# Patient Record
Sex: MALE | Race: WHITE | Employment: FULL TIME | URBAN - NONMETROPOLITAN AREA
[De-identification: names, ages, dates, MRNs, and addresses within clinical notes are randomized per-mention and may not be internally consistent; named-entity substitution may affect disease eponyms.]

---

## 2022-08-08 DIAGNOSIS — D69.6 THROMBOCYTOPENIA (HCC): Primary | ICD-10-CM

## 2022-08-10 ENCOUNTER — HOSPITAL ENCOUNTER (OUTPATIENT)
Dept: INFUSION THERAPY | Age: 52
Discharge: HOME | End: 2022-08-10
Payer: COMMERCIAL

## 2022-08-10 ENCOUNTER — OFFICE VISIT (OUTPATIENT)
Dept: HEMATOLOGY | Age: 52
End: 2022-08-10
Payer: COMMERCIAL

## 2022-08-10 VITALS
DIASTOLIC BLOOD PRESSURE: 80 MMHG | HEIGHT: 75 IN | SYSTOLIC BLOOD PRESSURE: 132 MMHG | OXYGEN SATURATION: 95 % | WEIGHT: 271.9 LBS | HEART RATE: 74 BPM | BODY MASS INDEX: 33.81 KG/M2

## 2022-08-10 DIAGNOSIS — D69.6 THROMBOCYTOPENIA (HCC): ICD-10-CM

## 2022-08-10 DIAGNOSIS — D69.6 THROMBOCYTOPENIA (HCC): Primary | ICD-10-CM

## 2022-08-10 DIAGNOSIS — R53.83 OTHER FATIGUE: ICD-10-CM

## 2022-08-10 DIAGNOSIS — R71.8 ELEVATED MCV: ICD-10-CM

## 2022-08-10 LAB
APTT: 33.3 SEC (ref 26–36.2)
BASOPHILS ABSOLUTE: 0.05 K/UL (ref 0.01–0.08)
BASOPHILS RELATIVE PERCENT: 0.9 % (ref 0.1–1.2)
C-REACTIVE PROTEIN: 1.07 MG/DL (ref 0–0.5)
D DIMER: 0.29 UG/ML FEU (ref 0–0.48)
EOSINOPHILS ABSOLUTE: 0.09 K/UL (ref 0.04–0.54)
EOSINOPHILS RELATIVE PERCENT: 1.7 % (ref 0.7–7)
FIBRINOGEN: 308 MG/DL (ref 238–505)
FOLATE: >20 NG/ML (ref 4.5–32.2)
HAV IGM SER IA-ACNC: NORMAL
HCT VFR BLD CALC: 51.6 % (ref 40.1–51)
HEMOGLOBIN: 16.7 G/DL (ref 13.7–17.5)
HEPATITIS B CORE IGM ANTIBODY: NORMAL
HEPATITIS B SURFACE ANTIGEN INTERPRETATION: NORMAL
HEPATITIS C ANTIBODY INTERPRETATION: NORMAL
HIV-1 P24 AG: NORMAL
INR BLD: 1.12 (ref 0.88–1.18)
LYMPHOCYTES ABSOLUTE: 1.48 K/UL (ref 1.18–3.74)
LYMPHOCYTES RELATIVE PERCENT: 28.1 % (ref 19.3–53.1)
MCH RBC QN AUTO: 33.6 PG (ref 25.7–32.2)
MCHC RBC AUTO-ENTMCNC: 32.4 G/DL (ref 32.3–36.5)
MCV RBC AUTO: 103.8 FL (ref 79–92.2)
MONOCYTES ABSOLUTE: 0.5 K/UL (ref 0.24–0.82)
MONOCYTES RELATIVE PERCENT: 9.5 % (ref 4.7–12.5)
NEUTROPHILS ABSOLUTE: 3.14 K/UL (ref 1.56–6.13)
NEUTROPHILS RELATIVE PERCENT: 59.6 % (ref 34–71.1)
PDW BLD-RTO: 12.5 % (ref 11.6–14.4)
PLATELET # BLD: 46 K/UL (ref 163–337)
PMV BLD AUTO: 11.9 FL (ref 7.4–10.4)
PROTHROMBIN TIME: 14.4 SEC (ref 12–14.6)
RAPID HIV 1&2: NORMAL
RBC # BLD: 4.97 M/UL (ref 4.63–6.08)
RHEUMATOID FACTOR: 12 IU/ML
SEDIMENTATION RATE, ERYTHROCYTE: 7 MM/HR (ref 0–15)
TSH SERPL DL<=0.05 MIU/L-ACNC: 1.64 UIU/ML (ref 0.27–4.2)
VITAMIN B-12: 783 PG/ML (ref 211–946)
WBC # BLD: 5.27 K/UL (ref 4.23–9.07)

## 2022-08-10 PROCEDURE — 99204 OFFICE O/P NEW MOD 45 MIN: CPT | Performed by: NURSE PRACTITIONER

## 2022-08-10 PROCEDURE — 85025 COMPLETE CBC W/AUTO DIFF WBC: CPT

## 2022-08-10 PROCEDURE — 99202 OFFICE O/P NEW SF 15 MIN: CPT

## 2022-08-10 RX ORDER — CETIRIZINE HYDROCHLORIDE 10 MG/1
10 TABLET ORAL DAILY
COMMUNITY

## 2022-08-10 RX ORDER — M-VIT,TX,IRON,MINS/CALC/FOLIC 27MG-0.4MG
1 TABLET ORAL DAILY
COMMUNITY

## 2022-08-10 ASSESSMENT — PROMIS GLOBAL HEALTH SCALE
IN GENERAL, HOW WOULD YOU RATE YOUR MENTAL HEALTH, INCLUDING YOUR MOOD AND YOUR ABILITY TO THINK [ON A SCALE OF 1 (POOR) TO 5 (EXCELLENT)]?: 3
IN THE PAST 7 DAYS, HOW OFTEN HAVE YOU BEEN BOTHERED BY EMOTIONAL PROBLEMS, SUCH AS FEELING ANXIOUS, DEPRESSED, OR IRRITABLE [ON A SCALE FROM 1 (NEVER) TO 5 (ALWAYS)]?: 2
TO WHAT EXTENT ARE YOU ABLE TO CARRY OUT YOUR EVERYDAY PHYSICAL ACTIVITIES SUCH AS WALKING, CLIMBING STAIRS, CARRYING GROCERIES, OR MOVING A CHAIR [ON A SCALE OF 1 (NOT AT ALL) TO 5 (COMPLETELY)]?: 4
IN GENERAL, WOULD YOU SAY YOUR HEALTH IS...[ON A SCALE OF 1 (POOR) TO 5 (EXCELLENT)]: 4
IN GENERAL, HOW WOULD YOU RATE YOUR SATISFACTION WITH YOUR SOCIAL ACTIVITIES AND RELATIONSHIPS [ON A SCALE OF 1 (POOR) TO 5 (EXCELLENT)]?: 3
IN THE PAST 7 DAYS, HOW WOULD YOU RATE YOUR FATIGUE ON AVERAGE [ON A SCALE FROM 1 (NONE) TO 5 (VERY SEVERE)]?: 5
SUM OF RESPONSES TO QUESTIONS 3, 6, 7, & 8: 12
IN GENERAL, WOULD YOU SAY YOUR QUALITY OF LIFE IS...[ON A SCALE OF 1 (POOR) TO 5 (EXCELLENT)]: 4
IN GENERAL, PLEASE RATE HOW WELL YOU CARRY OUT YOUR USUAL SOCIAL ACTIVITIES (INCLUDES ACTIVITIES AT HOME, AT WORK, AND IN YOUR COMMUNITY, AND RESPONSIBILITIES AS A PARENT, CHILD, SPOUSE, EMPLOYEE, FRIEND, ETC) [ON A SCALE OF 1 (POOR) TO 5 (EXCELLENT)]?: 3
IN GENERAL, HOW WOULD YOU RATE YOUR PHYSICAL HEALTH [ON A SCALE OF 1 (POOR) TO 5 (EXCELLENT)]?: 3
IN THE PAST 7 DAYS, HOW WOULD YOU RATE YOUR PAIN ON AVERAGE [ON A SCALE FROM 0 (NO PAIN) TO 10 (WORST IMAGINABLE PAIN)]?: 0
SUM OF RESPONSES TO QUESTIONS 2, 4, 5, & 10: 12

## 2022-08-10 NOTE — PROGRESS NOTES
Initial consultation note      Pt Name: Isabel Summers: 1970  MRN: [id number]    Date of evaluation: 8/10/2022  History Obtained From:  patient, electronic medical record    CHIEF COMPLAINT:    Chief Complaint   Patient presents with    New Patient     Low Platlets     HISTORY OF PRESENT ILLNESS:    Germania Presley is a 46 y.o.  male who is is here today in initial hematology consultation for thrombocytopenia, referred by Dr Kaye Centeno for for further evaluation and recommendations. Tarsha Pereira has a past medical history to include hypertension, elevated liver enzymes,  type II diabetes, tobacco use smoker 1 pack per day and routine consumption of alcohol (Drinks 6 beers every night and 18 on Saturday and  since age 10). He denied any excessive bruising or bleeding to include melena, epistaxis, hemoptysis, hematuria or hematochezia. Tarsha Pereira reported that he has been a  for the past 35 years and approximately 30 years ago he had significant exposure to true acid while working in [place]. He denied any significant other medical problems. I reviewed the available/following findings at initial consultation on 8/10/2022:    2019 Serology results  WBC 8.6, Hgb 17.3, Hct 50.6, MCV 95.3, platelets 924,594  Bili 0.8  AST 41  ALT 61  Alk Phos 116    2022 Serology results  WBC 5.5, Hgb 16.2, Hct 47.2, MCV 98, platelets 86,416  Alk Phos 187  Bili 0.9  AST 46  ALT 51    2022 Serology results  WBC 5.4, Hgb 15.9, Hct 46.3, , platelets 609,122  Alk Phos 226  Bili 0.5  AST 69  ALT 60    CBC at initial consultation on 8/10/2022: WBC 5.27, ANC 3.14, lymphocytes 28.1%, hemoglobin 16.7, hematocrit 51.6, .8 and a platelet count of 24,848      HEMATOLOGIC HISTORY:   Diagnosis:   Thrombocytopenia, currently of unknown etiology although has a significant history of alcohol use    Treatment summary:  Work-up in progress    Age-appropriate health screenin2022- PSA 0.8      Past Medical History:    Past Medical History:   Diagnosis Date    Diabetes mellitus ([address]     Elevated liver enzymes     Hypertension        Past Surgical History:    Past Surgical History:   Procedure Laterality Date    LITHOTRIPSY         Current Medications:    Current Outpatient Medications   Medication Sig Dispense Refill    cetirizine (ZYRTEC) 10 MG tablet Take 10 mg by mouth in the morning. Multiple Vitamins-Minerals (THERAPEUTIC MULTIVITAMIN-MINERALS) tablet Take 1 tablet by mouth in the morning. No current facility-administered medications for this visit. Allergies: Not on File    Social History:         Family History:   Family History   Problem Relation Age of Onset    Prostate Cancer Father        Vitals:  Vitals:    08/10/22 1021   BP: 132/80   Site: Left Upper Arm   Position: Sitting   Pulse: 74   SpO2: 95%   Weight: 271 lb 14.4 oz (123.3 kg)   Height: 6' 3\" (1.905 m)        Subjective   REVIEW OF SYSTEMS:   Review of Systems   Constitutional: Negative. Negative for chills, diaphoresis and fever. HENT: Negative. Negative for congestion, ear pain, hearing loss, nosebleeds, sore throat and tinnitus. Eyes: Negative. Negative for pain, discharge and redness. Respiratory: Negative. Negative for cough, shortness of breath and wheezing. Cardiovascular: Negative. Negative for chest pain, palpitations and leg swelling. Gastrointestinal: Negative. Negative for abdominal pain, blood in stool, constipation, diarrhea, nausea and vomiting. Endocrine: Negative for polydipsia. Genitourinary:  Negative for dysuria, flank pain, frequency, hematuria and urgency. Musculoskeletal: Negative. Negative for back pain, myalgias and neck pain. Skin: Negative. Negative for rash. Neurological: Negative. Negative for dizziness, tremors, seizures, weakness and headaches. Hematological:  Does not bruise/bleed easily. Psychiatric/Behavioral: Negative.   The patient is not nervous/anxious. Objective   PHYSICAL EXAM:  Physical Exam  Vitals reviewed. Constitutional:       General: He is not in acute distress. Appearance: He is well-developed. HENT:      Head: Normocephalic and atraumatic. Mouth/Throat:      Pharynx: Uvula midline. Tonsils: No tonsillar exudate. Eyes:      General: Lids are normal.      Conjunctiva/sclera: Conjunctivae normal.      Pupils: Pupils are equal, round, and reactive to light. Neck:      Thyroid: No thyroid mass or thyromegaly. Vascular: No JVD. Trachea: Trachea normal. No tracheal deviation. Cardiovascular:      Rate and Rhythm: Normal rate and regular rhythm. Pulses: Normal pulses. Heart sounds: Normal heart sounds. Pulmonary:      Effort: Pulmonary effort is normal. No respiratory distress. Breath sounds: Normal breath sounds. No wheezing or rales. Chest:      Chest wall: No tenderness. Abdominal:      General: Bowel sounds are normal. There is no distension. Palpations: Abdomen is soft. There is no mass. Tenderness: There is no abdominal tenderness. There is no guarding. Comments: Unable to palpate for organomegaly due to body habitus   Musculoskeletal:         General: No tenderness or deformity. Cervical back: Normal range of motion and neck supple. Comments: Range of motion within normal limits x4 extremities   Skin:     General: Skin is warm. Findings: No bruising, erythema or rash. Neurological:      Mental Status: He is alert and oriented to person, place, and time. Cranial Nerves: No cranial nerve deficit. Coordination: Coordination normal.   Psychiatric:         Behavior: Behavior normal.         Thought Content:  Thought content normal.       Labs reviewed today:  Lab Results   Component Value Date    WBC 5.27 08/10/2022    HGB 16.7 08/10/2022    HCT 51.6 (HH) 08/10/2022    .8 (H) 08/10/2022    PLT 46 (LL) 08/10/2022     Lab Results Component Value Date    NEUTROABS 3.14 08/10/2022         ASSESSMENT/PLAN:      1. Thrombocytopenia ([address] platelet count of 92,027 today, platelet count was noted to be slightly declined in November 2019 at 143,000 and appears to continue to decline, minimal lab results available for review. Denies any excessive bruising or bleeding to include melena, epistaxis, hemoptysis, hematuria or hematochezia. Routine consumption of alcohol (Drinks 6 beers every night and 18 on Saturday and Sunday since age 10). No known history of hypothyroidism, will evaluate for B12 folate deficiency, not related to medications only takes an a multivitamin and Zyrtec. Mild elevation alkaline phosphatase has been noted    - US ABDOMEN COMPLETE; to evaluate liver and spleen size  -CBC every 2 weeks    Obtain the following labs for further evaluation  - Path Review, Smear; Future  - Protime-INR; Future  - APTT; Future  - GERALDINE Screen with Reflex; Future  -Antiphospholipid syndrome  - C-Reactive Protein; Future  - Fibrinogen; Future  - D-Dimer, Quantitative; Future  -Platelet function assay  - Hepatitis Panel, Acute; Future  - HIV Rapid 1&2; Future  - Rheumatoid Factor; Future  - Sedimentation Rate; Future    Discussed potential need for bone marrow aspiration and biopsy in the future if platelet count does not recover for further evaluation. Lisbeth Brown would like to have this completed in [address] [place] [address] under sedation    2. Elevated MCV with a normal hemoglobin: .8, hemoglobin 16.7 and hematocrit 51.6    - Vitamin B12; Future  - Folate; Future    3. Other fatigue    - TSH; Future     I discussed all of the above findings included in the assessment and plan with the patient and the patient is in agreement to move forward with current recommendations/treatment. I have addressed all of their questions and concerns that were verbalized.     FOLLOW UP:  Follow-up appointment made for 6 weeks, or sooner, if needed  Continue to follow with other medical providers as recommended  Labs at next visit: CBC and CMP      EMR Dragon/Transcription disclaimer:   Much of this encounter note is an electronic transcription/translation of spoken language to printed text. The electronic translation of spoken language may permit erroneous, or at times, nonsensical words or phrases to be inadvertently transcribed; although attempts have made to review the note for such errors, some may still exist.  Please excuse any unrecognized transcription errors and contact us if the air is unintelligible or needs documented correction. Also, portions of this note have been copied forward, however, changed to reflect the most current clinical status of this patient. Electronically signed by Iver Lombard, APRN on 8/10/2022 at 11:01 AM   Alex DELONG am pre-charting as a registered nurse for [facility] Sloan.

## 2022-08-13 LAB
ANA IGG, ELISA: NORMAL
BLOOD SMEAR REVIEW: NORMAL

## 2022-08-14 ASSESSMENT — ENCOUNTER SYMPTOMS
EYE DISCHARGE: 0
ABDOMINAL PAIN: 0
SHORTNESS OF BREATH: 0
VOMITING: 0
EYE PAIN: 0
GASTROINTESTINAL NEGATIVE: 1
CONSTIPATION: 0
NAUSEA: 0
COUGH: 0
WHEEZING: 0
SORE THROAT: 0
BACK PAIN: 0
EYES NEGATIVE: 1
DIARRHEA: 0
RESPIRATORY NEGATIVE: 1
BLOOD IN STOOL: 0
EYE REDNESS: 0

## 2022-08-19 ENCOUNTER — HOSPITAL ENCOUNTER (OUTPATIENT)
Dept: ULTRASOUND IMAGING | Age: 52
Discharge: HOME | End: 2022-08-19
Payer: COMMERCIAL

## 2022-08-19 DIAGNOSIS — D69.6 THROMBOCYTOPENIA (HCC): ICD-10-CM

## 2022-08-19 LAB
ANTICARDIOLIPIN IGG ANTIBODY: <10 GPL
BETA-2 GLYCOPROTEIN 1 IGG ANTIBODY: <10 SGU
BETA-2 GLYCOPROTEIN 1 IGM ANTIBODY: <10 SMU
CARDIOLIPIN AB IGM: <10 MPL
DRVVT CONFIRMATION TEST: ABNORMAL RATIO
DRVVT SCREEN: 32 SEC (ref 33–44)
DRVVT,DIL: ABNORMAL SEC (ref 33–44)
HEXAGONAL PHOSPHOLIPID NEUTRALIZAT TEST: ABNORMAL
LUPUS ANTICOAG INTERP: ABNORMAL
PLT NEUTA: ABNORMAL
PT D: 14.3 SEC (ref 12–15.5)
PTT D: 46 SEC (ref 32–48)
PTT-D CORR REFLEX: ABNORMAL SEC (ref 32–48)
PTT-HEPARIN NEUTRALIZED: ABNORMAL SEC (ref 32–48)
REPTILASE TIME: ABNORMAL SEC
THROMBIN TIME: ABNORMAL SEC (ref 14.7–19.5)

## 2022-08-19 PROCEDURE — 76700 US EXAM ABDOM COMPLETE: CPT

## 2022-08-27 LAB
BASOPHILS ABSOLUTE: 0.1 X10E3/UL (ref 0–0.2)
BASOPHILS RELATIVE PERCENT: 1 %
EOSINOPHILS ABSOLUTE: 0.1 X10E3/UL (ref 0–0.4)
EOSINOPHILS RELATIVE PERCENT: 3 %
ERYTHROCYTES, NUCLEATED/100 LEU: ABNORMAL
HCT VFR BLD CALC: 48.7 % (ref 37.5–51)
HEMOGLOBIN: 16.7 G/DL (ref 13–17.7)
IMMATURE CELLS ABSOLUTE COUNT: ABNORMAL
IMMATURE GRANS (ABS): 0 X10E3/UL (ref 0–0.1)
IMMATURE GRANULOCYTES: 0 %
LYMPHOCYTES ABSOLUTE: 1.5 X10E3/UL (ref 0.7–3.1)
LYMPHOCYTES RELATIVE PERCENT: 28 %
MCH RBC QN AUTO: 33.5 PG (ref 26.6–33)
MCHC RBC AUTO-ENTMCNC: 34.3 G/DL (ref 31.5–35.7)
MCV RBC AUTO: 98 FL (ref 79–97)
MONOCYTES ABSOLUTE: 0.6 X10E3/UL (ref 0.1–0.9)
MONOCYTES RELATIVE PERCENT: 11 %
MORPHOLOGY: ABNORMAL
NEUTROPHILS ABSOLUTE: 3.1 X10E3/UL (ref 1.4–7)
PDW BLD-RTO: 12.8 % (ref 11.6–15.4)
PLATELET # BLD: 76 X10E3/UL (ref 150–450)
RBC # BLD: 4.98 X10E6/UL (ref 4.14–5.8)
SEGMENTED NEUTROPHILS RELATIVE PERCENT: 57 %
WBC # BLD: 5.4 X10E3/UL (ref 3.4–10.8)

## 2022-09-22 NOTE — PROGRESS NOTES
Initial consultation note      Pt Name: Roxana Harmon: 1970  MRN: [id number]    Date of evaluation: 8/10/2022  History Obtained From:  patient, electronic medical record    CHIEF COMPLAINT:    Chief Complaint   Patient presents with    Follow-up     Thrombocytopenia ([address]     HISTORY OF PRESENT ILLNESS:    Suzanne Tracey is a 45 y.o.  male who is returns today after being seen in initial consultation on 8/10/2022 for thrombocytopenia to review serology studies and for further recommendations. .  He denies any excessive bruising or bleeding to include melena, epistaxis, hemoptysis, hematuria or hematochezia. [facility] confirms he continues to consume alcohol daily, 6 beers every night and approximately 18 on the weekends. I reviewed the following results with Chuywoodrow Postal and his spouse, no findings to support thrombocytopenia. Labs 8/10/2022:   Peripheral blood smear:  MACROCYTIC NORMOCHROMIC ERYTHROCYTES   MODERATE THROMBOCYTOPENIA    ERYTHROCYTES: normal count, macrocytic normochromic, minimal anisopoikilocytosis, no significant polychromasia   WHITE BLOOD CELLS: normal count, mature forms with normal segmentation and granulation, no increase in blasts   PLATELETS: decreased, normal size and granularity      - PT: 14.4, INR: 1.12, APTT: 33.3  - GERALDINE Screen with Reflex: not detected  -Antiphospholipid syndrome: Lupus anticoagulant: not detected, Cardiolipin antibodies IgM: <10 , IgG: <10,  Beta-2 glycoprotein antibodies IgM: <10 , IgG: <10  - C-Reactive Protein: 1.07  - Fibrinogen: 308  - D-Dimer, Quantitative:0.29  - Hepatitis Panel, Acute: non-reactive  - HIV Rapid 1&2: non-reactive  - Rheumatoid Factor: 12.0  - Sedimentation Rate: 7  -TSH: 1.640  -Folate:>20.0  -Vitamin B12 783    8/19/2022: Ultrasound of the abdomen  Hepatomegaly with liver measuring 23.7 cm and increase echogenicity.   Splenomegaly with the spleen measuring 19.6 x 6.2 x 20 cm    Recommendation is to move forward with a bone marrow aspiration and biopsy, Dietrich Gottron is in agreement although he will have to delay for a few weeks due to his work schedule, biopsy needs to be scheduled on a Friday. Today's clinic visit to include physical assessment, review of systems, any lab or radiographic findings that were available and plan of care are documented below. HEMATOLOGIC HISTORY:   Diagnosis: Thrombocytopenia with hepatosplenomegaly and long-term alcohol use with differential of bone marrow dyscrasia     Treatment summary:  Work-up in progress    Dietrich Gottron was seen in initial hematology consultation on 8/10/2022 for thrombocytopenia, referred by Dr Jerilyn Vitale for  further evaluation and recommendations. Dietrich Gottron has a past medical history to include hypertension, elevated liver enzymes,  type II diabetes, tobacco use smoker 1 pack per day and routine consumption of alcohol (Drinks 6 beers every night and 18 on Saturday and Sunday since age 10). He denied any excessive bruising or bleeding to include melena, epistaxis, hemoptysis, hematuria or hematochezia. Dietrich Gottron reported that he has been a  for the past 35 years and approximately 30 years ago he had significant exposure to true acid while working in [facility]. He denied any significant other medical problems.       I reviewed the available/following findings at initial consultation on 8/10/2022:    11/04/2019 Serology results  WBC 8.6, Hgb 17.3, Hct 50.6, MCV 95.3, platelets 081,501  Bili 0.8  AST 41  ALT 61  Alk Phos 116    07/01/2022 Serology results  WBC 5.5, Hgb 16.2, Hct 47.2, MCV 98, platelets 29,268  Alk Phos 187  Bili 0.9  AST 46  ALT 51    07/08/2022 Serology results  WBC 5.4, Hgb 15.9, Hct 46.3, , platelets 960,899  Alk Phos 226  Bili 0.5  AST 69  ALT 60    CBC at initial consultation on 8/10/2022: WBC 5.27, ANC 3.14, lymphocytes 28.1%, hemoglobin 16.7, hematocrit 51.6, .8 and a platelet count of 66,475      Labs 8/10/2022:   Peripheral blood smear:  MACROCYTIC NORMOCHROMIC ERYTHROCYTES   MODERATE THROMBOCYTOPENIA    ERYTHROCYTES: normal count, macrocytic normochromic, minimal anisopoikilocytosis, no significant polychromasia   WHITE BLOOD CELLS: normal count, mature forms with normal segmentation and granulation, no increase in blasts   PLATELETS: decreased, normal size and granularity      - PT: 14.4, INR: 1.12, APTT: 33.3  - GERALDINE Screen with Reflex: not detected  -Antiphospholipid syndrome: Lupus anticoagulant: not detected, Cardiolipin antibodies IgM: <10 , IgG: <10,  Beta-2 glycoprotein antibodies IgM: <10 , IgG: <10  - C-Reactive Protein: 1.07  - Fibrinogen: 308  - D-Dimer, Quantitative:0.29  - Hepatitis Panel, Acute: non-reactive  - HIV Rapid 1&2: non-reactive  - Rheumatoid Factor: 12.0  - Sedimentation Rate: 7  -TSH: 1.640  -Folate:>20.0      2022: Ultrasound of the abdomen  Hepatomegaly with liver measuring 23.7 cm and increase echogenicity. Splenomegaly with the spleen measuring 19.6 x 6.2 x 20 cm    Age-appropriate health screenin2022- PSA 0.8      Past Medical History:    Past Medical History:   Diagnosis Date    Diabetes mellitus ([address]     Elevated liver enzymes     Hypertension        Past Surgical History:    Past Surgical History:   Procedure Laterality Date    LITHOTRIPSY         Current Medications:    Current Outpatient Medications   Medication Sig Dispense Refill    metFORMIN (GLUCOPHAGE) 500 MG tablet       cetirizine (ZYRTEC) 10 MG tablet Take 10 mg by mouth in the morning. Multiple Vitamins-Minerals (THERAPEUTIC MULTIVITAMIN-MINERALS) tablet Take 1 tablet by mouth in the morning. No current facility-administered medications for this visit.         Allergies: No Known Allergies    Social History:         Family History:   Family History   Problem Relation Age of Onset    Prostate Cancer Father        Vitals:  Vitals:    22 1031   BP: (!) 152/86   Pulse: 84   SpO2: 96%   Weight: 271 lb 11.2 oz (123.2 kg)        Subjective REVIEW OF SYSTEMS:   Review of Systems   Constitutional: Negative. Negative for chills, diaphoresis and fever. HENT: Negative. Negative for congestion, ear pain, hearing loss, nosebleeds, sore throat and tinnitus. Eyes: Negative. Negative for pain, discharge and redness. Respiratory: Negative. Negative for cough, shortness of breath and wheezing. Cardiovascular: Negative. Negative for chest pain, palpitations and leg swelling. Gastrointestinal: Negative. Negative for abdominal pain, blood in stool, constipation, diarrhea, nausea and vomiting. Endocrine: Negative for polydipsia. Genitourinary:  Negative for dysuria, flank pain, frequency, hematuria and urgency. Musculoskeletal: Negative. Negative for back pain, myalgias and neck pain. Skin: Negative. Negative for rash. Neurological: Negative. Negative for dizziness, tremors, seizures, weakness and headaches. Hematological:  Does not bruise/bleed easily. Psychiatric/Behavioral: Negative. The patient is not nervous/anxious. Objective   PHYSICAL EXAM:  Physical Exam  Vitals reviewed. Constitutional:       General: He is not in acute distress. Appearance: He is well-developed. HENT:      Head: Normocephalic and atraumatic. Mouth/Throat:      Pharynx: Uvula midline. Tonsils: No tonsillar exudate. Eyes:      General: Lids are normal.      Conjunctiva/sclera: Conjunctivae normal.      Pupils: Pupils are equal, round, and reactive to light. Neck:      Thyroid: No thyroid mass or thyromegaly. Vascular: No JVD. Trachea: Trachea normal. No tracheal deviation. Cardiovascular:      Rate and Rhythm: Normal rate and regular rhythm. Pulses: Normal pulses. Heart sounds: Normal heart sounds. Pulmonary:      Effort: Pulmonary effort is normal. No respiratory distress. Breath sounds: Normal breath sounds. No wheezing or rales. Chest:      Chest wall: No tenderness.    Abdominal:      General: Bowel sounds are normal. There is no distension. Palpations: Abdomen is soft. There is no mass. Tenderness: There is no abdominal tenderness. There is no guarding. Musculoskeletal:         General: No tenderness or deformity. Cervical back: Normal range of motion and neck supple. Comments: Range of motion within normal limits x4 extremities   Skin:     General: Skin is warm. Findings: No bruising, erythema or rash. Neurological:      Mental Status: He is alert and oriented to person, place, and time. Cranial Nerves: No cranial nerve deficit. Coordination: Coordination normal.   Psychiatric:         Behavior: Behavior normal.         Thought Content: Thought content normal.       Labs reviewed today:  Lab Results   Component Value Date    WBC 4.96 09/23/2022    HGB 16.3 09/23/2022    HCT 49.3 09/23/2022    .0 (H) 09/23/2022    PLT 66 (L) 09/23/2022     Lab Results   Component Value Date    NEUTROABS 3.04 09/23/2022         ASSESSMENT/PLAN:      1. Thrombocytopenia ([address] platelet count of 59,620 today. Denies any excessive bruising or bleeding to include melena, epistaxis, hemoptysis, hematuria or hematochezia. Confirms continues to consume alcohol (Drinks 6 beers every night and 18 on Saturday and Sunday since age 10).     Labs 8/10/2022:   Peripheral blood smear:  MACROCYTIC NORMOCHROMIC ERYTHROCYTES   MODERATE THROMBOCYTOPENIA    ERYTHROCYTES: normal count, macrocytic normochromic, minimal anisopoikilocytosis, no significant polychromasia   WHITE BLOOD CELLS: normal count, mature forms with normal segmentation and granulation, no increase in blasts   PLATELETS: decreased, normal size and granularity      - PT: 14.4, INR: 1.12, APTT: 33.3  - GERALDINE Screen with Reflex: not detected  -Antiphospholipid syndrome: Lupus anticoagulant: not detected, Cardiolipin antibodies IgM: <10 , IgG: <10,  Beta-2 glycoprotein antibodies IgM: <10 , IgG: <10  - C-Reactive Protein: 1.07  - Fibrinogen: 308  - D-Dimer, Quantitative:0.29  - Hepatitis Panel, Acute: non-reactive  - HIV Rapid 1&2: non-reactive  - Rheumatoid Factor: 12.0  - Sedimentation Rate: 7      8/19/2022: Ultrasound of the abdomen  Hepatomegaly with liver measuring 23.7 cm and increase echogenicity. Splenomegaly with the spleen measuring 19.6 x 6.2 x 20 cm      Thrombocytopenia is suspected to be secondary to splenomegaly, alcohol use and with a differential of a bone marrow dyscrasia. Recommendation is to move forward with a bone marrow aspiration and biopsy. , Janette Fontanez is in agreement although he will have to delay for a few weeks due to his work schedule, biopsy needs to be scheduled on a Friday. Janette Fontanez would like to have this completed in [address] [place] [address] under sedation.    -Schedule bone marrow aspiration and biopsy in [facility] [place]    2. Elevated MCV with a normal hemoglobin: .0, hemoglobin 16.3 and hematocrit 49.3  Vitamin B12 783 and folate >20 on 8/10/2022, both are within normal limits      3. Other fatigue, grade I and stable. TSH 1.640 on 8/10/2022, within normal limits      I discussed all of the above findings included in the assessment and plan with the patient and the patient is in agreement to move forward with current recommendations/treatment. I have addressed all of their questions and concerns that were verbalized. FOLLOW UP:  Follow-up appointment made for 2 weeks after scheduled bone marrow biopsy in [facility] [place], or sooner, if needed  Schedule bone marrow aspiration biopsy in [facility] [place] on a Friday  Continue to follow with other medical providers as recommended  Labs at next visit: CBC and CMP      EMR Dragon/Transcription disclaimer:   Much of this encounter note is an electronic transcription/translation of spoken language to printed text.  The electronic translation of spoken language may permit erroneous, or at times, nonsensical words or phrases to be inadvertently transcribed; although attempts have made to review the note for such errors, some may still exist.  Please excuse any unrecognized transcription errors and contact us if the error is unintelligible or needs documented correction. Also, portions of this note have been copied forward, however, changed to reflect the most current clinical status of this patient. Electronically signed by Laurel Paget, APRN on 9/29/2022 at 4:55 PM   Marjan DELONG am pre-charting as a registered nurse for [facility]IMBER[facility] Haddad.

## 2022-09-23 ENCOUNTER — HOSPITAL ENCOUNTER (OUTPATIENT)
Dept: INFUSION THERAPY | Age: 52
Discharge: HOME | End: 2022-09-23
Payer: COMMERCIAL

## 2022-09-23 ENCOUNTER — OFFICE VISIT (OUTPATIENT)
Dept: HEMATOLOGY | Age: 52
End: 2022-09-23
Payer: COMMERCIAL

## 2022-09-23 VITALS
SYSTOLIC BLOOD PRESSURE: 152 MMHG | HEART RATE: 84 BPM | DIASTOLIC BLOOD PRESSURE: 86 MMHG | WEIGHT: 271.7 LBS | OXYGEN SATURATION: 96 % | BODY MASS INDEX: 33.96 KG/M2

## 2022-09-23 DIAGNOSIS — D69.6 THROMBOCYTOPENIA (HCC): ICD-10-CM

## 2022-09-23 DIAGNOSIS — D69.6 THROMBOCYTOPENIA (HCC): Primary | ICD-10-CM

## 2022-09-23 DIAGNOSIS — R71.8 ELEVATED MCV: ICD-10-CM

## 2022-09-23 LAB
BASOPHILS ABSOLUTE: 0.05 K/UL (ref 0.01–0.08)
BASOPHILS RELATIVE PERCENT: 1 % (ref 0.1–1.2)
EOSINOPHILS ABSOLUTE: 0.06 K/UL (ref 0.04–0.54)
EOSINOPHILS RELATIVE PERCENT: 1.2 % (ref 0.7–7)
HCT VFR BLD CALC: 49.3 % (ref 40.1–51)
HEMOGLOBIN: 16.3 G/DL (ref 13.7–17.5)
LYMPHOCYTES ABSOLUTE: 1.33 K/UL (ref 1.18–3.74)
LYMPHOCYTES RELATIVE PERCENT: 26.8 % (ref 19.3–53.1)
MCH RBC QN AUTO: 33.4 PG (ref 25.7–32.2)
MCHC RBC AUTO-ENTMCNC: 33.1 G/DL (ref 32.3–36.5)
MCV RBC AUTO: 101 FL (ref 79–92.2)
MONOCYTES ABSOLUTE: 0.47 K/UL (ref 0.24–0.82)
MONOCYTES RELATIVE PERCENT: 9.5 % (ref 4.7–12.5)
NEUTROPHILS ABSOLUTE: 3.04 K/UL (ref 1.56–6.13)
NEUTROPHILS RELATIVE PERCENT: 61.3 % (ref 34–71.1)
PDW BLD-RTO: 13 % (ref 11.6–14.4)
PLATELET # BLD: 66 K/UL (ref 163–337)
PMV BLD AUTO: 11.9 FL (ref 7.4–10.4)
RBC # BLD: 4.88 M/UL (ref 4.63–6.08)
WBC # BLD: 4.96 K/UL (ref 4.23–9.07)

## 2022-09-23 PROCEDURE — 99213 OFFICE O/P EST LOW 20 MIN: CPT | Performed by: NURSE PRACTITIONER

## 2022-09-23 PROCEDURE — 99212 OFFICE O/P EST SF 10 MIN: CPT

## 2022-09-23 PROCEDURE — 85025 COMPLETE CBC W/AUTO DIFF WBC: CPT

## 2022-09-29 ASSESSMENT — ENCOUNTER SYMPTOMS
SHORTNESS OF BREATH: 0
EYE DISCHARGE: 0
BLOOD IN STOOL: 0
GASTROINTESTINAL NEGATIVE: 1
EYE PAIN: 0
NAUSEA: 0
EYES NEGATIVE: 1
SORE THROAT: 0
EYE REDNESS: 0
COUGH: 0
RESPIRATORY NEGATIVE: 1
ABDOMINAL PAIN: 0
WHEEZING: 0
VOMITING: 0
BACK PAIN: 0
DIARRHEA: 0
CONSTIPATION: 0

## 2022-10-01 LAB
BASOPHILS ABSOLUTE: 0 X10E3/UL (ref 0–0.2)
BASOPHILS RELATIVE PERCENT: 1 %
EOSINOPHILS ABSOLUTE: 0.1 X10E3/UL (ref 0–0.4)
EOSINOPHILS RELATIVE PERCENT: 2 %
ERYTHROCYTES, NUCLEATED/100 LEU: ABNORMAL
HCT VFR BLD CALC: 47.7 % (ref 37.5–51)
HEMOGLOBIN: 16.4 G/DL (ref 13–17.7)
IMMATURE CELLS ABSOLUTE COUNT: ABNORMAL
IMMATURE GRANS (ABS): 0 X10E3/UL (ref 0–0.1)
IMMATURE GRANULOCYTES: 1 %
LYMPHOCYTES ABSOLUTE: 1.1 X10E3/UL (ref 0.7–3.1)
LYMPHOCYTES RELATIVE PERCENT: 23 %
MCH RBC QN AUTO: 33.5 PG (ref 26.6–33)
MCHC RBC AUTO-ENTMCNC: 34.4 G/DL (ref 31.5–35.7)
MCV RBC AUTO: 97 FL (ref 79–97)
MONOCYTES ABSOLUTE: 0.6 X10E3/UL (ref 0.1–0.9)
MONOCYTES RELATIVE PERCENT: 12 %
MORPHOLOGY: ABNORMAL
NEUTROPHILS ABSOLUTE: 3 X10E3/UL (ref 1.4–7)
PDW BLD-RTO: 12.7 % (ref 11.6–15.4)
PLATELET # BLD: 69 X10E3/UL (ref 150–450)
RBC # BLD: 4.9 X10E6/UL (ref 4.14–5.8)
SEGMENTED NEUTROPHILS RELATIVE PERCENT: 61 %
WBC # BLD: 4.8 X10E3/UL (ref 3.4–10.8)

## 2022-10-11 ENCOUNTER — ANESTHESIA EVENT (OUTPATIENT)
Dept: OPERATING ROOM | Age: 52
End: 2022-10-11

## 2022-10-13 NOTE — H&P
Patient Name: Jono Becerra  : 1970  MRN: [id number]  DATE: 10/14/22    Allergies: No Known Allergies       History and Physical    Procedure:    [x] Bone Marrow Aspiration and Biopsy    [x] Previous office notes/History and Physical reviewed from the patients chart. Please see EMR for further details of HPI. I have examined the patient's status immediately prior to the procedure and:      Indications/HPI:    Jono Becerra is a 45 y.o.  male who is being seen for thrombocytopenia suspected to be secondary to splenomegaly, alcohol use and with a differential of a bone marrow dyscrasia. Bone marrow aspiration and biopsy is warranted for further evaluation. HEMATOLOGIC HISTORY:   Diagnosis: Thrombocytopenia with hepatosplenomegaly and long-term alcohol use with differential of bone marrow dyscrasia      Treatment summary:  Work-up in progress     Krystle Valencia was seen in initial hematology consultation on 8/10/2022 for thrombocytopenia, referred by Dr Layne Solomon for  further evaluation and recommendations. Krystle Valencia has a past medical history to include hypertension, elevated liver enzymes,  type II diabetes, tobacco use smoker 1 pack per day and routine consumption of alcohol (Drinks 6 beers every night and 18 on Saturday and  since age 10). He denied any excessive bruising or bleeding to include melena, epistaxis, hemoptysis, hematuria or hematochezia. Krystle Valencia reported that he has been a  for the past 35 years and approximately 30 years ago he had significant exposure to true acid while working in [facility]. He denied any significant other medical problems.        I reviewed the available/following findings at initial consultation on 8/10/2022:     2019 Serology results  WBC 8.6, Hgb 17.3, Hct 50.6, MCV 95.3, platelets 255,738  Bili 0.8  AST 41  ALT 61  Alk Phos 116     2022 Serology results  WBC 5.5, Hgb 16.2, Hct 47.2, MCV 98, platelets 35,865  Alk Phos 187  Bili 0.9  AST 46  ALT 51     07/08/2022 Serology results  WBC 5.4, Hgb 15.9, Hct 46.3, , platelets 922,534  Alk Phos 226  Bili 0.5  AST 69  ALT 60     CBC at initial consultation on 8/10/2022: WBC 5.27, ANC 3.14, lymphocytes 28.1%, hemoglobin 16.7, hematocrit 51.6, .8 and a platelet count of 36,569        Labs 8/10/2022:   Peripheral blood smear:  MACROCYTIC NORMOCHROMIC ERYTHROCYTES   MODERATE THROMBOCYTOPENIA    ERYTHROCYTES: normal count, macrocytic normochromic, minimal anisopoikilocytosis, no significant polychromasia   WHITE BLOOD CELLS: normal count, mature forms with normal segmentation and granulation, no increase in blasts   PLATELETS: decreased, normal size and granularity       - PT: 14.4, INR: 1.12, APTT: 33.3  - GERALDINE Screen with Reflex: not detected  -Antiphospholipid syndrome: Lupus anticoagulant: not detected, Cardiolipin antibodies IgM: <10 , IgG: <10,  Beta-2 glycoprotein antibodies IgM: <10 , IgG: <10  - C-Reactive Protein: 1.07  - Fibrinogen: 308  - D-Dimer, Quantitative:0.29  - Hepatitis Panel, Acute: non-reactive  - HIV Rapid 1&2: non-reactive  - Rheumatoid Factor: 12.0  - Sedimentation Rate: 7  -TSH: 1.640  -Folate:>20.0        8/19/2022: Ultrasound of the abdomen  Hepatomegaly with liver measuring 23.7 cm and increase echogenicity. Splenomegaly with the spleen measuring 19.6 x 6.2 x 20 cm       Anesthesia:   [x] MAC [] Moderate Sedation   [] General   [] None     ROS: 12 pt Review of Symptoms was negative unless mentioned above    Medications:   Prior to Admission medications    Medication Sig Start Date End Date Taking? Authorizing Provider   metFORMIN (GLUCOPHAGE) 500 MG tablet  8/26/22   Historical Provider, MD   cetirizine (ZYRTEC) 10 MG tablet Take 10 mg by mouth in the morning. Historical Provider, MD   Multiple Vitamins-Minerals (THERAPEUTIC MULTIVITAMIN-MINERALS) tablet Take 1 tablet by mouth in the morning.     Historical Provider, MD       Past Medical History:  Past Medical

## 2022-10-14 ENCOUNTER — HOSPITAL ENCOUNTER (OUTPATIENT)
Age: 52
Setting detail: OUTPATIENT SURGERY
Discharge: HOME | End: 2022-10-14
Attending: INTERNAL MEDICINE | Admitting: INTERNAL MEDICINE

## 2022-10-14 ENCOUNTER — ANESTHESIA (OUTPATIENT)
Dept: OPERATING ROOM | Age: 52
End: 2022-10-14

## 2022-10-14 VITALS
SYSTOLIC BLOOD PRESSURE: 117 MMHG | HEIGHT: 75 IN | WEIGHT: 271 LBS | TEMPERATURE: 97.7 F | RESPIRATION RATE: 14 BRPM | OXYGEN SATURATION: 95 % | HEART RATE: 73 BPM | BODY MASS INDEX: 33.69 KG/M2 | DIASTOLIC BLOOD PRESSURE: 83 MMHG

## 2022-10-14 PROCEDURE — 38222 DX BONE MARROW BX & ASPIR: CPT

## 2022-10-14 RX ORDER — LIDOCAINE HYDROCHLORIDE 10 MG/ML
INJECTION, SOLUTION EPIDURAL; INFILTRATION; INTRACAUDAL; PERINEURAL PRN
Status: DISCONTINUED | OUTPATIENT
Start: 2022-10-14 | End: 2022-10-14 | Stop reason: SDUPTHER

## 2022-10-14 RX ORDER — LIDOCAINE HYDROCHLORIDE 10 MG/ML
INJECTION, SOLUTION EPIDURAL; INFILTRATION; INTRACAUDAL; PERINEURAL PRN
Status: DISCONTINUED | OUTPATIENT
Start: 2022-10-14 | End: 2022-10-14 | Stop reason: ALTCHOICE

## 2022-10-14 RX ORDER — SODIUM CHLORIDE, SODIUM LACTATE, POTASSIUM CHLORIDE, CALCIUM CHLORIDE 600; 310; 30; 20 MG/100ML; MG/100ML; MG/100ML; MG/100ML
INJECTION, SOLUTION INTRAVENOUS CONTINUOUS
Status: DISCONTINUED | OUTPATIENT
Start: 2022-10-14 | End: 2022-10-14 | Stop reason: HOSPADM

## 2022-10-14 RX ORDER — PROPOFOL 10 MG/ML
INJECTION, EMULSION INTRAVENOUS PRN
Status: DISCONTINUED | OUTPATIENT
Start: 2022-10-14 | End: 2022-10-14 | Stop reason: SDUPTHER

## 2022-10-14 RX ORDER — LIDOCAINE HYDROCHLORIDE 10 MG/ML
1 INJECTION, SOLUTION EPIDURAL; INFILTRATION; INTRACAUDAL; PERINEURAL
Status: DISCONTINUED | OUTPATIENT
Start: 2022-10-14 | End: 2022-10-14 | Stop reason: HOSPADM

## 2022-10-14 RX ADMIN — SODIUM CHLORIDE, SODIUM LACTATE, POTASSIUM CHLORIDE, CALCIUM CHLORIDE: 600; 310; 30; 20 INJECTION, SOLUTION INTRAVENOUS at 07:25

## 2022-10-14 RX ADMIN — PROPOFOL 450 MG: 10 INJECTION, EMULSION INTRAVENOUS at 08:31

## 2022-10-14 RX ADMIN — LIDOCAINE HYDROCHLORIDE 30 MG: 10 INJECTION, SOLUTION EPIDURAL; INFILTRATION; INTRACAUDAL; PERINEURAL at 08:31

## 2022-10-14 NOTE — DISCHARGE INSTRUCTIONS
Call  [facility] office with any questions at [phone]. Keep area dry until tomorrow.

## 2022-10-14 NOTE — ANESTHESIA PRE PROCEDURE
Department of Anesthesiology  Preprocedure Note       Name:  Anastasiya Norman   Age:  45 y.o.  :  1970                                          MRN:  [id number]         Date:  10/14/2022      Surgeon: Nelida Wynne):  KIMBERLY Ham    Procedure: Procedure(s):  BONE MARROW ASPIRATION BIOPSY    Medications prior to admission:   Prior to Admission medications    Medication Sig Start Date End Date Taking? Authorizing Provider   metFORMIN (GLUCOPHAGE) 500 MG tablet  22   Historical Provider, MD   cetirizine (ZYRTEC) 10 MG tablet Take 10 mg by mouth in the morning. Historical Provider, MD   Multiple Vitamins-Minerals (THERAPEUTIC MULTIVITAMIN-MINERALS) tablet Take 1 tablet by mouth in the morning. Historical Provider, MD       Current medications:    Current Facility-Administered Medications   Medication Dose Route Frequency Provider Last Rate Last Admin    lidocaine PF 1 % injection 1 mL  1 mL IntraDERmal Once PRN KIMBERYL Garcia CRNA        lactated ringers infusion   IntraVENous Continuous KIMBERLY Garcia CRNA           Allergies:  No Known Allergies    Problem List:  There is no problem list on file for this patient. Past Medical History:        Diagnosis Date    Diabetes mellitus ([address]     Elevated liver enzymes     Hypertension        Past Surgical History:        Procedure Laterality Date    LITHOTRIPSY         Social History:    Social History     Tobacco Use    Smoking status: Every Day     Packs/day: 1.00     Years: 30.00     Pack years: 30.00     Types: Cigarettes    Smokeless tobacco: Never   Substance Use Topics    Alcohol use:  Yes     Alcohol/week: 21.0 standard drinks     Types: 21 Cans of beer per week                                Ready to quit: Not Answered  Counseling given: Not Answered      Vital Signs (Current):   Vitals:    10/14/22 0658   BP: (!) 152/91   Pulse: 75   Resp: 16   Temp: 98.2 °F (36.8 °C)   TempSrc: Temporal   SpO2: 94%   Weight: 271 lb (122.9 kg)   Height: 6' 3\" (1.905 m)                                              BP Readings from Last 3 Encounters:   10/14/22 (!) 152/91   09/23/22 (!) 152/86   08/10/22 132/80       NPO Status: Time of last liquid consumption: 0530                        Time of last solid consumption: 2000                        Date of last liquid consumption: 10/14/22                        Date of last solid food consumption: 10/13/22    BMI:   Wt Readings from Last 3 Encounters:   10/14/22 271 lb (122.9 kg)   09/23/22 271 lb 11.2 oz (123.2 kg)   08/10/22 271 lb 14.4 oz (123.3 kg)     Body mass index is 33.87 kg/m². CBC:   Lab Results   Component Value Date/Time    WBC 4.8 09/30/2022 07:35 AM    RBC 4.90 09/30/2022 07:35 AM    HGB 16.4 09/30/2022 07:35 AM    HCT 47.7 09/30/2022 07:35 AM    MCV 97 09/30/2022 07:35 AM    RDW 12.7 09/30/2022 07:35 AM    PLT 69 09/30/2022 07:35 AM       CMP: No results found for: NA, K, CL, CO2, BUN, CREATININE, GFRAA, AGRATIO, LABGLOM, GLUCOSE, GLU, PROT, CALCIUM, BILITOT, ALKPHOS, AST, ALT    POC Tests: No results for input(s): POCGLU, POCNA, POCK, POCCL, POCBUN, POCHEMO, POCHCT in the last 72 hours.     Coags:   Lab Results   Component Value Date/Time    PROTIME 14.4 08/10/2022 11:15 AM    INR 1.12 08/10/2022 11:15 AM    APTT 33.3 08/10/2022 11:15 AM       HCG (If Applicable): No results found for: PREGTESTUR, PREGSERUM, HCG, HCGQUANT     ABGs: No results found for: PHART, PO2ART, WCF6WWR, SNH0VGP, BEART, V4VLMESH     Type & Screen (If Applicable):  No results found for: LABABO, LABRH    Drug/Infectious Status (If Applicable):  No results found for: HIV, HEPCAB    COVID-19 Screening (If Applicable): No results found for: COVID19        Anesthesia Evaluation  Patient summary reviewed and Nursing notes reviewed  Airway: Mallampati: II  TM distance: >3 FB   Neck ROM: full  Mouth opening: > = 3 FB   Dental: normal exam         Pulmonary:Negative Pulmonary ROS and normal exam Cardiovascular:Negative CV ROS  Exercise tolerance: poor (<4 METS),   (+) hypertension: no interval change,          Beta Blocker:  Not on Beta Blocker         Neuro/Psych:   Negative Neuro/Psych ROS              GI/Hepatic/Renal: Neg GI/Hepatic/Renal ROS            Endo/Other: Negative Endo/Other ROS   (+) DiabetesType II DM, , .                 Abdominal:             Vascular: negative vascular ROS. Other Findings:           Anesthesia Plan      MAC     ASA 2       Induction: intravenous. Anesthetic plan and risks discussed with patient. Plan discussed with CRNA.                     KIMBERLY Garay - CRNA   10/14/2022

## 2022-10-14 NOTE — DISCHARGE SUMMARY
Discharge Summary    Kamryn Pearson  :  1970  MRN:  [id number]    Admit date:  10/14/2022  Discharge date:  10/14/2022      Primary Care Physician:  Yara Adam DO    Portions of this note have been copied forward, however, changed to reflect the most current clinical status of this patient. Discharge Diagnoses: Persistent thrombocytopenia    Significant Diagnostic procedure: Bone marrow aspiration and biopsy    Outpatient clinical course:  Bone marrow aspiration and biopsy was tolerated well, please see procedural note. Physical Exam: Please see today's History and Physical note    Vitals: /83   Pulse 73   Temp 97.7 °F (36.5 °C) (Temporal)   Resp 14   Ht 6' 3\" (1.905 m)   Wt 271 lb (122.9 kg)   SpO2 95%   BMI 33.87 kg/m²     Discharge Medications:         Medication List        ASK your doctor about these medications      cetirizine 10 MG tablet  Commonly known as: ZYRTEC     metFORMIN 500 MG tablet  Commonly known as: GLUCOPHAGE     therapeutic multivitamin-minerals tablet              Discharge Instructions: Follow up with Yue Foote recommended  Follow-up in clinic within 7-10 business days as scheduled to review biopsy results. Take medications as directed. Resume activity as tolerated. No driving or lifting heavy machinery at least the next 12 hours. Resume diet as tolerated. Leave the pressure dressing in place for 24 hours, avoid getting the dressing wet. Contact the clinic or seek medical attention if uncontrolled bleeding occurs. Disposition: Patient is medically stable and will be discharged home.         Signed:  KIMBERLY Cooper   10/14/2022, 10:01 AM

## 2022-10-14 NOTE — PROCEDURES
Patient name: Kamryn Pearson  Patient : 1970  [id number]  10/14/2022    Procedure Note: Bone Marrow Aspirate and Biopsy    Indication: Persistent thrombocytopenia      SITE: Right Iliac crest   After informed consent was obtained the patient was placed in the Left lateral ducubitus position. A time out was performed indicating the procedure and the patient, and all was comfirmed by the nurse. The Right posterior iliac crest was located and prepped and draped in a sterile fashion Lidocaine 1% was injected for local anesthesia. An Jamshidi needle was placed and a bone marrow aspirate was obtained. The Jamshidi needle was placed in a different location and a core biopsy was obtained. Pressure was held until homeostasis was obtained. A sterile dressing was applied. The patient tolerated the procedure well with no apparent complications. Estimated blood loss was minimal. The specimen was sent to pathology for flow cytometry, cytogenetics, histology and FISH if indicated.        KIMBERLY Loyd

## 2022-10-14 NOTE — ANESTHESIA POSTPROCEDURE EVALUATION
Department of Anesthesiology  Postprocedure Note    Patient: Markus Spencer  MRN: [id number]  YOB: 1970  Date of evaluation: 10/14/2022      Procedure Summary     Date: 10/14/22 Room / Location: [facility] ENDO 01 / [address]    Anesthesia Start: 5870 Anesthesia Stop: 3101    Procedure: BONE MARROW ASPIRATION BIOPSY (Pelvis) Diagnosis:        Thrombocytopenia ([address]      (Thrombocytopenia ([address] [D69.6])    Surgeons: KIMBERLY Storey Responsible Provider:     Anesthesia Type: MAC ASA Status: 2          Anesthesia Type: MAC    Madonna Phase I:      Madonna Phase II:        Anesthesia Post Evaluation    Patient location during evaluation: bedside  Patient participation: complete - patient participated  Level of consciousness: awake  Pain score: 0  Airway patency: patent  Nausea & Vomiting: no nausea and no vomiting  Complications: no  Cardiovascular status: hemodynamically stable  Respiratory status: acceptable, room air and spontaneous ventilation  Hydration status: euvolemic

## 2022-10-27 NOTE — PROGRESS NOTES
Progress note      Pt Name: Estrada Bates: 1970  MRN: [id number]    Date of evaluation: 10/28/2022  History Obtained From:  patient, electronic medical record    CHIEF COMPLAINT:    Chief Complaint   Patient presents with    Follow-up     Thrombocytopenia ([facility] [address]     HISTORY OF PRESENT ILLNESS:    Elia Charles is a 45 y.o.  male who is for thrombocytopenia suspected to be secondary to hepatosplenomegaly and long-term alcohol use. Cristina Sage confirms he continues to consume alcohol daily, 6 beers every night and approximately 18 on the weekends no change from previous evaluation. Denies any excessive bruising or bleeding to include melena, epistaxis, hemoptysis, hematuria or hematochezia. Cristina Sage returns today in follow-up to review review bone marrow aspiration biopsy results and for further treatment recommendations. His platelet count remains stable at 71,000 today. Bone marrow aspiration biopsy on 10/14/2022 was unrevealing with no findings to of bone marrow dyscrasia to include myeloproliferative disorder or malignancy. Today's clinic visit to include physical assessment, review of systems, any lab or radiographic findings that were available and plan of care are documented below. HEMATOLOGIC HISTORY:   Diagnosis: Thrombocytopenia with hepatosplenomegaly and long-term alcohol use    Treatment summary:  Recommend cessation of alcohol use  Conservative monitoring    Cristina Sage was seen in initial hematology consultation on 8/10/2022 for thrombocytopenia, referred by Dr Zuleima Adams for  further evaluation and recommendations. Cristina Sage has a past medical history to include hypertension, elevated liver enzymes,  type II diabetes, tobacco use smoker 1 pack per day and routine consumption of alcohol (Drinks 6 beers every night and 18 on Saturday and Sunday since age 10). He denied any excessive bruising or bleeding to include melena, epistaxis, hemoptysis, hematuria or hematochezia.   Cristina Sage reported that he has been a  for the past 35 years and approximately 30 years ago he had significant exposure to true acid while working in [employer]. He denied any significant other medical problems. I reviewed the available/following findings at initial consultation on 8/10/2022:    11/04/2019 Serology results  WBC 8.6, Hgb 17.3, Hct 50.6, MCV 95.3, platelets 277,656  Bili 0.8  AST 41  ALT 61  Alk Phos 116    07/01/2022 Serology results  WBC 5.5, Hgb 16.2, Hct 47.2, MCV 98, platelets 32,364  Alk Phos 187  Bili 0.9  AST 46  ALT 51    07/08/2022 Serology results  WBC 5.4, Hgb 15.9, Hct 46.3, , platelets 237,696  Alk Phos 226  Bili 0.5  AST 69  ALT 60    CBC at initial consultation on 8/10/2022: WBC 5.27, ANC 3.14, lymphocytes 28.1%, hemoglobin 16.7, hematocrit 51.6, .8 and a platelet count of 30,950    Labs 8/10/2022:   Peripheral blood smear:  MACROCYTIC NORMOCHROMIC ERYTHROCYTES   MODERATE THROMBOCYTOPENIA    ERYTHROCYTES: normal count, macrocytic normochromic, minimal anisopoikilocytosis, no significant polychromasia   WHITE BLOOD CELLS: normal count, mature forms with normal segmentation and granulation, no increase in blasts   PLATELETS: decreased, normal size and granularity      - PT: 14.4, INR: 1.12, APTT: 33.3  - GERALDINE Screen with Reflex: not detected  -Antiphospholipid syndrome: Lupus anticoagulant: not detected, Cardiolipin antibodies IgM: <10 , IgG: <10,  Beta-2 glycoprotein antibodies IgM: <10 , IgG: <10  - C-Reactive Protein: 1.07  - Fibrinogen: 308  - D-Dimer, Quantitative:0.29  - Hepatitis Panel, Acute: non-reactive  - HIV Rapid 1&2: non-reactive  - Rheumatoid Factor: 12.0  - Sedimentation Rate: 7  -TSH: 1.640  -Folate:>20.0    8/19/2022: Ultrasound of the abdomen  Hepatomegaly with liver measuring 23.7 cm and increase echogenicity.   Splenomegaly with the spleen measuring 19.6 x 6.2 x 20 cm    10/14/2022 Bone marrow biopsy and aspiration- normocellular (40-50% cellular) marrow with multilineage hematopoiesis; storage iron present, no ring sideroblasts; negative for dysplasia, no increase in blast; negative FISH study (MDS panel); normal male karyotype; no B-cell monoclonality and no T-cell aberrant antigenic expression. Age-appropriate health screenin2022- PSA 0.8    Past Medical History:    Past Medical History:   Diagnosis Date    Diabetes mellitus ([address]     Elevated liver enzymes     Hypertension        Past Surgical History:    Past Surgical History:   Procedure Laterality Date    BONE MARROW BIOPSY N/A 10/14/2022    BONE MARROW ASPIRATION BIOPSY performed by KIMBERLY Carvajal at [facility]    LITHOTRIPSY         Current Medications:    Current Outpatient Medications   Medication Sig Dispense Refill    metFORMIN (GLUCOPHAGE) 500 MG tablet       cetirizine (ZYRTEC) 10 MG tablet Take 10 mg by mouth in the morning. Multiple Vitamins-Minerals (THERAPEUTIC MULTIVITAMIN-MINERALS) tablet Take 1 tablet by mouth in the morning. No current facility-administered medications for this visit. Allergies: No Known Allergies    Social History:    Social History     Tobacco Use    Smoking status: Every Day     Packs/day: 1.00     Years: 30.00     Pack years: 30.00     Types: Cigarettes    Smokeless tobacco: Never   Vaping Use    Vaping Use: Never used   Substance Use Topics    Alcohol use: Yes     Alcohol/week: 21.0 standard drinks     Types: 21 Cans of beer per week    Drug use: Never       Family History:   Family History   Problem Relation Age of Onset    Prostate Cancer Father        Vitals:  Vitals:    10/28/22 1144   BP: (!) 162/90   Pulse: 85   SpO2: 93%   Weight: 269 lb (122 kg)   Height: 6' 4\" (1.93 m)        Subjective   REVIEW OF SYSTEMS:   Review of Systems   Constitutional: Negative. Negative for chills, diaphoresis and fever. HENT: Negative. Negative for congestion, ear pain, hearing loss, nosebleeds, sore throat and tinnitus. Eyes: Negative. Negative for pain, discharge and redness. Respiratory: Negative. Negative for cough, shortness of breath and wheezing. Cardiovascular: Negative. Negative for chest pain, palpitations and leg swelling. Gastrointestinal: Negative. Negative for abdominal pain, blood in stool, constipation, diarrhea, nausea and vomiting. Endocrine: Negative for polydipsia. Genitourinary:  Negative for dysuria, flank pain, frequency, hematuria and urgency. Musculoskeletal: Negative. Negative for back pain, myalgias and neck pain. Skin: Negative. Negative for rash. Neurological: Negative. Negative for dizziness, tremors, seizures, weakness and headaches. Hematological:  Does not bruise/bleed easily. Psychiatric/Behavioral: Negative. The patient is not nervous/anxious. Objective   PHYSICAL EXAM:  Physical Exam  Vitals reviewed. Constitutional:       General: He is not in acute distress. Appearance: He is well-developed. HENT:      Head: Normocephalic and atraumatic. Mouth/Throat:      Pharynx: Uvula midline. Tonsils: No tonsillar exudate. Eyes:      General: Lids are normal.      Conjunctiva/sclera: Conjunctivae normal.      Pupils: Pupils are equal, round, and reactive to light. Neck:      Thyroid: No thyroid mass or thyromegaly. Vascular: No JVD. Trachea: Trachea normal. No tracheal deviation. Cardiovascular:      Rate and Rhythm: Normal rate and regular rhythm. Pulses: Normal pulses. Heart sounds: Normal heart sounds. Pulmonary:      Effort: Pulmonary effort is normal. No respiratory distress. Breath sounds: Normal breath sounds. No wheezing or rales. Chest:      Chest wall: No tenderness. Abdominal:      General: Bowel sounds are normal. There is no distension. Palpations: Abdomen is soft. There is no mass. Tenderness: There is no abdominal tenderness. There is no guarding. Musculoskeletal:         General: No tenderness or deformity. Cervical back: Normal range of motion and neck supple. Comments: Range of motion within normal limits x4 extremities   Skin:     General: Skin is warm. Findings: No bruising, erythema or rash. Neurological:      Mental Status: He is alert and oriented to person, place, and time. Cranial Nerves: No cranial nerve deficit. Coordination: Coordination normal.   Psychiatric:         Behavior: Behavior normal.         Thought Content: Thought content normal.       Labs reviewed today:  Lab Results   Component Value Date    WBC 5.19 10/28/2022    HGB 16.3 10/28/2022    HCT 48.4 10/28/2022    MCV 99.6 (H) 10/28/2022    PLT 71 (L) 10/28/2022     Lab Results   Component Value Date    NEUTROABS 2.91 10/28/2022         ASSESSMENT/PLAN:      1. Thrombocytopenia ([address] secondary to hepatosplenomegaly and long-term alcohol use. Platelet count of 81,762 today. Confirms continues to consume alcohol (Drinks 6 beers every night and 18 on Saturday and Sunday since age 10), no change from previous evaluation. 10/14/2022 Bone marrow biopsy and aspiration- normocellular (40-50% cellular) marrow with multilineage hematopoiesis; storage iron present, no ring sideroblasts; negative for dysplasia, no increase in blast; negative FISH study (MDS panel); normal male karyotype; no B-cell monoclonality and no T-cell aberrant antigenic expression. Bone marrow biopsy with no indications of a bone marrow dyscrasia to include myeloproliferative disorder or malignancy. -Recommendation is to cease use of alcohol  -Monitor closely for bruising/bleeding  -Monitor platelet count conservatively    2. Elevated MCV with a normal hemoglobin: Improving. MCV 99.6, hemoglobin 16.3 and hematocrit 48.4  Vitamin B12 783 and folate >20 on 8/10/2022, both are within normal limits    3. Other fatigue, grade I.  TSH 1.640 on 8/10/2022, within normal limits.   No change from previous evaluation      I discussed all of the above findings included in the assessment and plan with the patient and the patient is in agreement to move forward with current recommendations/treatment. I have addressed all of their questions and concerns that were verbalized. FOLLOW UP:  Follow-up appointment made for 18 weeks   Continue to follow with other medical providers as recommended  Labs at next visit: CBC and CMP      EMR Dragon/Transcription disclaimer:   Much of this encounter note is an electronic transcription/translation of spoken language to printed text. The electronic translation of spoken language may permit erroneous, or at times, nonsensical words or phrases to be inadvertently transcribed; although attempts have made to review the note for such errors, some may still exist.  Please excuse any unrecognized transcription errors and contact us if the error is unintelligible or needs documented correction. Also, portions of this note have been copied forward, however, changed to reflect the most current clinical status of this patient. Electronically signed by KIMBERLY Loyd on 11/1/2022 at 7:49 PM   Rochelle DELONG am pre-charting as a registered nurse for KIMBERLY Cooper.

## 2022-10-28 ENCOUNTER — HOSPITAL ENCOUNTER (OUTPATIENT)
Dept: INFUSION THERAPY | Age: 52
Discharge: HOME | End: 2022-10-28
Payer: COMMERCIAL

## 2022-10-28 ENCOUNTER — OFFICE VISIT (OUTPATIENT)
Dept: HEMATOLOGY | Age: 52
End: 2022-10-28
Payer: COMMERCIAL

## 2022-10-28 VITALS
WEIGHT: 269 LBS | HEART RATE: 85 BPM | OXYGEN SATURATION: 93 % | HEIGHT: 76 IN | BODY MASS INDEX: 32.76 KG/M2 | SYSTOLIC BLOOD PRESSURE: 162 MMHG | DIASTOLIC BLOOD PRESSURE: 90 MMHG

## 2022-10-28 DIAGNOSIS — D69.6 THROMBOCYTOPENIA (HCC): Primary | ICD-10-CM

## 2022-10-28 DIAGNOSIS — R16.2 HEPATOSPLENOMEGALY: ICD-10-CM

## 2022-10-28 DIAGNOSIS — Z78.9 ALCOHOL USE: ICD-10-CM

## 2022-10-28 DIAGNOSIS — D69.6 THROMBOCYTOPENIA (HCC): ICD-10-CM

## 2022-10-28 LAB
BASOPHILS ABSOLUTE: 0.06 K/UL (ref 0.01–0.08)
BASOPHILS RELATIVE PERCENT: 1.2 % (ref 0.1–1.2)
EOSINOPHILS ABSOLUTE: 0.11 K/UL (ref 0.04–0.54)
EOSINOPHILS RELATIVE PERCENT: 2.1 % (ref 0.7–7)
HCT VFR BLD CALC: 48.4 % (ref 40.1–51)
HEMOGLOBIN: 16.3 G/DL (ref 13.7–17.5)
LYMPHOCYTES ABSOLUTE: 1.57 K/UL (ref 1.18–3.74)
LYMPHOCYTES RELATIVE PERCENT: 30.3 % (ref 19.3–53.1)
MCH RBC QN AUTO: 33.5 PG (ref 25.7–32.2)
MCHC RBC AUTO-ENTMCNC: 33.7 G/DL (ref 32.3–36.5)
MCV RBC AUTO: 99.6 FL (ref 79–92.2)
MONOCYTES ABSOLUTE: 0.53 K/UL (ref 0.24–0.82)
MONOCYTES RELATIVE PERCENT: 10.2 % (ref 4.7–12.5)
NEUTROPHILS ABSOLUTE: 2.91 K/UL (ref 1.56–6.13)
NEUTROPHILS RELATIVE PERCENT: 56 % (ref 34–71.1)
PDW BLD-RTO: 13.6 % (ref 11.6–14.4)
PLATELET # BLD: 71 K/UL (ref 163–337)
PMV BLD AUTO: 11.2 FL (ref 7.4–10.4)
RBC # BLD: 4.86 M/UL (ref 4.63–6.08)
WBC # BLD: 5.19 K/UL (ref 4.23–9.07)

## 2022-10-28 PROCEDURE — 36415 COLL VENOUS BLD VENIPUNCTURE: CPT

## 2022-10-28 PROCEDURE — 85025 COMPLETE CBC W/AUTO DIFF WBC: CPT

## 2022-10-28 PROCEDURE — 99212 OFFICE O/P EST SF 10 MIN: CPT | Performed by: NURSE PRACTITIONER

## 2022-10-28 PROCEDURE — 99211 OFF/OP EST MAY X REQ PHY/QHP: CPT

## 2022-11-01 ASSESSMENT — ENCOUNTER SYMPTOMS
EYE DISCHARGE: 0
SHORTNESS OF BREATH: 0
NAUSEA: 0
DIARRHEA: 0
BACK PAIN: 0
ABDOMINAL PAIN: 0
EYE PAIN: 0
BLOOD IN STOOL: 0
COUGH: 0
EYE REDNESS: 0
CONSTIPATION: 0
GASTROINTESTINAL NEGATIVE: 1
RESPIRATORY NEGATIVE: 1
SORE THROAT: 0
VOMITING: 0
EYES NEGATIVE: 1
WHEEZING: 0

## 2023-04-25 ENCOUNTER — TRANSCRIBE ORDERS (OUTPATIENT)
Dept: ADMINISTRATIVE | Facility: HOSPITAL | Age: 53
End: 2023-04-25
Payer: COMMERCIAL

## 2023-04-25 DIAGNOSIS — R93.5 ABNORMAL FINDINGS ON DIAGNOSTIC IMAGING OF ABDOMEN: Primary | ICD-10-CM

## 2023-05-01 ENCOUNTER — LAB (OUTPATIENT)
Dept: LAB | Facility: HOSPITAL | Age: 53
End: 2023-05-01
Payer: COMMERCIAL

## 2023-05-01 ENCOUNTER — TRANSCRIBE ORDERS (OUTPATIENT)
Dept: ADMINISTRATIVE | Facility: HOSPITAL | Age: 53
End: 2023-05-01
Payer: COMMERCIAL

## 2023-05-01 ENCOUNTER — HOSPITAL ENCOUNTER (OUTPATIENT)
Dept: ULTRASOUND IMAGING | Facility: HOSPITAL | Age: 53
Discharge: HOME | End: 2023-05-01
Payer: COMMERCIAL

## 2023-05-01 DIAGNOSIS — R16.2 HEPATOSPLENOMEGALY: ICD-10-CM

## 2023-05-01 DIAGNOSIS — R16.2 HEPATOSPLENOMEGALY: Primary | ICD-10-CM

## 2023-05-01 DIAGNOSIS — R93.5 ABNORMAL FINDINGS ON DIAGNOSTIC IMAGING OF ABDOMEN: ICD-10-CM

## 2023-05-01 LAB
ALBUMIN SERPL-MCNC: 4.3 G/DL (ref 3.5–5.2)
ALBUMIN/GLOB SERPL: 1.3 G/DL
ALP SERPL-CCNC: 150 U/L (ref 39–117)
ALPHA-FETOPROTEIN: 6.38 NG/ML (ref 0–8.3)
ALPHA1 GLOB MFR UR ELPH: 105 MG/DL (ref 90–200)
ALT SERPL W P-5'-P-CCNC: 50 U/L (ref 1–41)
ANION GAP SERPL CALCULATED.3IONS-SCNC: 15 MMOL/L (ref 5–15)
AST SERPL-CCNC: 44 U/L (ref 1–40)
BILIRUB SERPL-MCNC: 1.1 MG/DL (ref 0–1.2)
BUN SERPL-MCNC: 11 MG/DL (ref 6–20)
BUN/CREAT SERPL: 12.2 (ref 7–25)
CALCIUM SPEC-SCNC: 9.3 MG/DL (ref 8.6–10.5)
CERULOPLASMIN SERPL-MCNC: 23 MG/DL (ref 16–31)
CHLORIDE SERPL-SCNC: 105 MMOL/L (ref 98–107)
CO2 SERPL-SCNC: 24 MMOL/L (ref 22–29)
CREAT SERPL-MCNC: 0.9 MG/DL (ref 0.76–1.27)
DEPRECATED RDW RBC AUTO: 48.1 FL (ref 37–54)
EGFRCR SERPLBLD CKD-EPI 2021: 101.5 ML/MIN/1.73
EOSINOPHIL # BLD MANUAL: 0.09 10*3/MM3 (ref 0–0.4)
EOSINOPHIL NFR BLD MANUAL: 2 % (ref 0.3–6.2)
ERYTHROCYTE [DISTWIDTH] IN BLOOD BY AUTOMATED COUNT: 13.2 % (ref 12.3–15.4)
GLOBULIN UR ELPH-MCNC: 3.4 GM/DL
GLUCOSE SERPL-MCNC: 165 MG/DL (ref 65–99)
HAV IGM SERPL QL IA: NORMAL
HBA1C MFR BLD: 6.7 % (ref 4.8–5.6)
HBV CORE IGM SERPL QL IA: NORMAL
HBV SURFACE AG SERPL QL IA: NORMAL
HCT VFR BLD AUTO: 51.6 % (ref 37.5–51)
HCV AB SER DONR QL: NORMAL
HGB BLD-MCNC: 17.7 G/DL (ref 13–17.7)
INR PPP: 0.99 (ref 0.91–1.09)
IRON 24H UR-MRATE: 151 MCG/DL (ref 59–158)
IRON SATN MFR SERPL: 33 % (ref 20–50)
LYMPHOCYTES # BLD MANUAL: 1.83 10*3/MM3 (ref 0.7–3.1)
LYMPHOCYTES NFR BLD MANUAL: 3 % (ref 5–12)
MCH RBC QN AUTO: 33.6 PG (ref 26.6–33)
MCHC RBC AUTO-ENTMCNC: 34.3 G/DL (ref 31.5–35.7)
MCV RBC AUTO: 97.9 FL (ref 79–97)
MONOCYTES # BLD: 0.14 10*3/MM3 (ref 0.1–0.9)
NEUTROPHILS # BLD AUTO: 2.63 10*3/MM3 (ref 1.7–7)
NEUTROPHILS NFR BLD MANUAL: 56 % (ref 42.7–76)
PLAT MORPH BLD: NORMAL
PLATELET # BLD AUTO: 66 10*3/MM3 (ref 140–450)
PMV BLD AUTO: 12.3 FL (ref 6–12)
POTASSIUM SERPL-SCNC: 4.1 MMOL/L (ref 3.5–5.2)
PROT SERPL-MCNC: 7.7 G/DL (ref 6–8.5)
PROTHROMBIN TIME: 13.2 SECONDS (ref 11.8–14.8)
RBC # BLD AUTO: 5.27 10*6/MM3 (ref 4.14–5.8)
RBC MORPH BLD: NORMAL
SODIUM SERPL-SCNC: 144 MMOL/L (ref 136–145)
TIBC SERPL-MCNC: 463 MCG/DL (ref 298–536)
TRANSFERRIN SERPL-MCNC: 311 MG/DL (ref 200–360)
TSH SERPL DL<=0.05 MIU/L-ACNC: 1.05 UIU/ML (ref 0.27–4.2)
VARIANT LYMPHS NFR BLD MANUAL: 39 % (ref 19.6–45.3)
WBC MORPH BLD: NORMAL
WBC NRBC COR # BLD: 4.69 10*3/MM3 (ref 3.4–10.8)

## 2023-05-01 PROCEDURE — 82103 ALPHA-1-ANTITRYPSIN TOTAL: CPT

## 2023-05-01 PROCEDURE — 80074 ACUTE HEPATITIS PANEL: CPT

## 2023-05-01 PROCEDURE — 86015 ACTIN ANTIBODY EACH: CPT

## 2023-05-01 PROCEDURE — 36415 COLL VENOUS BLD VENIPUNCTURE: CPT

## 2023-05-01 PROCEDURE — 83540 ASSAY OF IRON: CPT

## 2023-05-01 PROCEDURE — 86381 MITOCHONDRIAL ANTIBODY EACH: CPT

## 2023-05-01 PROCEDURE — 82105 ALPHA-FETOPROTEIN SERUM: CPT

## 2023-05-01 PROCEDURE — 84466 ASSAY OF TRANSFERRIN: CPT

## 2023-05-01 PROCEDURE — 82390 ASSAY OF CERULOPLASMIN: CPT

## 2023-05-01 PROCEDURE — 80050 GENERAL HEALTH PANEL: CPT

## 2023-05-01 PROCEDURE — 83036 HEMOGLOBIN GLYCOSYLATED A1C: CPT

## 2023-05-01 PROCEDURE — 86663 EPSTEIN-BARR ANTIBODY: CPT

## 2023-05-01 PROCEDURE — 85610 PROTHROMBIN TIME: CPT

## 2023-05-01 PROCEDURE — 85007 BL SMEAR W/DIFF WBC COUNT: CPT

## 2023-05-01 PROCEDURE — 76705 ECHO EXAM OF ABDOMEN: CPT

## 2023-05-02 LAB
EBV EA-D IGG SER-ACNC: 32 U/ML (ref 0–8.9)
MITOCHONDRIA M2 IGG SER-ACNC: <20 UNITS (ref 0–20)
SMA IGG SER-ACNC: 10 UNITS (ref 0–19)

## 2023-05-05 ENCOUNTER — OFFICE VISIT (OUTPATIENT)
Dept: HEMATOLOGY | Age: 53
End: 2023-05-05
Payer: COMMERCIAL

## 2023-05-05 ENCOUNTER — HOSPITAL ENCOUNTER (OUTPATIENT)
Dept: INFUSION THERAPY | Age: 53
Discharge: HOME | End: 2023-05-05
Payer: COMMERCIAL

## 2023-05-05 VITALS
BODY MASS INDEX: 32.32 KG/M2 | SYSTOLIC BLOOD PRESSURE: 136 MMHG | HEART RATE: 76 BPM | OXYGEN SATURATION: 97 % | WEIGHT: 265.5 LBS | DIASTOLIC BLOOD PRESSURE: 76 MMHG

## 2023-05-05 DIAGNOSIS — Z78.9 ALCOHOL USE: ICD-10-CM

## 2023-05-05 DIAGNOSIS — D69.6 THROMBOCYTOPENIA (HCC): ICD-10-CM

## 2023-05-05 DIAGNOSIS — D69.6 THROMBOCYTOPENIA (HCC): Primary | ICD-10-CM

## 2023-05-05 DIAGNOSIS — K70.30 ALCOHOLIC CIRRHOSIS OF LIVER WITHOUT ASCITES (HCC): ICD-10-CM

## 2023-05-05 DIAGNOSIS — R16.2 HEPATOSPLENOMEGALY: ICD-10-CM

## 2023-05-05 DIAGNOSIS — R71.8 ELEVATED MCV: ICD-10-CM

## 2023-05-05 PROCEDURE — 99213 OFFICE O/P EST LOW 20 MIN: CPT | Performed by: NURSE PRACTITIONER

## 2023-05-05 PROCEDURE — 99211 OFF/OP EST MAY X REQ PHY/QHP: CPT

## 2023-05-05 RX ORDER — LEVOFLOXACIN 500 MG/1
TABLET, FILM COATED ORAL
COMMUNITY
Start: 2023-03-07

## 2023-05-05 RX ORDER — FLUTICASONE PROPIONATE 50 MCG
SPRAY, SUSPENSION (ML) NASAL
COMMUNITY
Start: 2023-03-21

## 2023-05-05 RX ORDER — LISINOPRIL 10 MG/1
TABLET ORAL
COMMUNITY
Start: 2023-04-12

## 2023-05-10 ASSESSMENT — ENCOUNTER SYMPTOMS
WHEEZING: 0
BACK PAIN: 0
DIARRHEA: 0
EYE DISCHARGE: 0
COUGH: 0
EYE PAIN: 0
SORE THROAT: 0
CONSTIPATION: 0
NAUSEA: 0
EYE REDNESS: 0
RESPIRATORY NEGATIVE: 1
SHORTNESS OF BREATH: 0
ABDOMINAL PAIN: 0
VOMITING: 0
EYES NEGATIVE: 1
BLOOD IN STOOL: 0
GASTROINTESTINAL NEGATIVE: 1

## 2023-12-29 ENCOUNTER — TELEPHONE (OUTPATIENT)
Dept: HEMATOLOGY | Age: 53
End: 2023-12-29

## 2023-12-29 NOTE — TELEPHONE ENCOUNTER
Called patient and reminded patient of their appointment on 1/03/2024and patient confirmed they would be here.

## 2024-01-02 DIAGNOSIS — D69.6 THROMBOCYTOPENIA (HCC): Primary | ICD-10-CM

## 2024-01-02 DIAGNOSIS — K70.30 ALCOHOLIC CIRRHOSIS OF LIVER WITHOUT ASCITES (HCC): ICD-10-CM

## 2024-01-02 ASSESSMENT — ENCOUNTER SYMPTOMS
GASTROINTESTINAL NEGATIVE: 1
VOMITING: 0
EYE DISCHARGE: 0
EYES NEGATIVE: 1
DIARRHEA: 0
NAUSEA: 0
EYE REDNESS: 0
WHEEZING: 0
ABDOMINAL PAIN: 0
SHORTNESS OF BREATH: 0
COUGH: 0
CONSTIPATION: 0
SORE THROAT: 0
EYE PAIN: 0
RESPIRATORY NEGATIVE: 1
BLOOD IN STOOL: 0
BACK PAIN: 0

## 2024-01-02 NOTE — PROGRESS NOTES
Progress note      Pt Name: Michael Sainz  YOB: 1970  MRN: [id number]    Date of evaluation: 01/03/2024  History Obtained From:  patient, electronic medical record    CHIEF COMPLAINT:    Chief Complaint   Patient presents with    Follow-up     05/05/2023  Office VisitThrombocytopenia (HCC)  10/28/2022  Office VisitThrombocytopenia (HCC)  09/23/2022  Office VisitThrombocytopenia (HCC)       HISTORY OF PRESENT ILLNESS:    Michael Sainz is a 53 y.o.  male who is followed for thrombocytopenia suspected to be secondary to hepatosplenomegaly, long-term alcohol use and suspected hepatic cirrhosis.  Bone marrow aspiration biopsy on 10/14/2022 was unrevealing with no findings to of bone marrow dyscrasia to include myeloproliferative disorder or malignancy. Current recommendation is for conservative monitoring to include AFP and ultrasound of the abdomen every 6 months due to increased risk development of hepatocellular carcinoma.  Michael returns today in follow-up, lab monitoring and for further treatment recommendations.      Today's clinic visit to include physical assessment, review of systems, any lab or radiographic findings that were available and plan of care are documented below.    HEMATOLOGIC HISTORY:   Diagnosis:  Thrombocytopenia with hepatosplenomegaly, long-term alcohol use and suspected hepatic cirrhosis    Treatment summary:  Recommend cessation of alcohol use  AFP and ultrasound of the abdomen every 6 months due to increased risk development of hepatocellular carcinoma  Conservative monitoring    Michael was seen in initial hematology consultation on 8/10/2022 for thrombocytopenia, referred by Dr Salguero for  further evaluation and recommendations.  Michael has a past medical history to include hypertension, elevated liver enzymes,  type II diabetes, tobacco use smoker 1 pack per day and routine consumption of alcohol (Drinks 6 beers every night and 18 on Saturday and Sunday since age

## 2024-01-03 ENCOUNTER — HOSPITAL ENCOUNTER (OUTPATIENT)
Dept: INFUSION THERAPY | Age: 54
Discharge: HOME | End: 2024-01-03
Payer: COMMERCIAL

## 2024-01-03 ENCOUNTER — OFFICE VISIT (OUTPATIENT)
Dept: HEMATOLOGY | Age: 54
End: 2024-01-03
Payer: COMMERCIAL

## 2024-01-03 DIAGNOSIS — D69.6 THROMBOCYTOPENIA (HCC): ICD-10-CM

## 2024-01-03 DIAGNOSIS — K70.30 ALCOHOLIC CIRRHOSIS OF LIVER WITHOUT ASCITES (HCC): ICD-10-CM

## 2024-01-03 DIAGNOSIS — D69.6 THROMBOCYTOPENIA (HCC): Primary | ICD-10-CM

## 2024-01-03 DIAGNOSIS — R16.2 HEPATOSPLENOMEGALY: ICD-10-CM

## 2024-01-03 LAB
AFP SERPL-MCNC: 8.1 NG/ML (ref 0–8.3)
ALBUMIN SERPL-MCNC: 4.6 G/DL (ref 3.5–5.2)
ALP SERPL-CCNC: 204 U/L (ref 40–130)
ALT SERPL-CCNC: 58 U/L (ref 21–72)
ANION GAP SERPL CALCULATED.3IONS-SCNC: 11 MMOL/L (ref 7–19)
AST SERPL-CCNC: 65 U/L (ref 17–59)
BASOPHILS # BLD: 0.04 K/UL (ref 0.01–0.08)
BASOPHILS NFR BLD: 0.9 % (ref 0.1–1.2)
BILIRUB SERPL-MCNC: 1.2 MG/DL (ref 0.2–1.3)
BUN SERPL-MCNC: 9 MG/DL (ref 9–20)
CALCIUM SERPL-MCNC: 9.5 MG/DL (ref 8.4–10.2)
CHLORIDE SERPL-SCNC: 106 MMOL/L (ref 98–111)
CO2 SERPL-SCNC: 24 MMOL/L (ref 22–29)
CREAT SERPL-MCNC: 0.8 MG/DL (ref 0.6–1.2)
EOSINOPHIL # BLD: 0.14 K/UL (ref 0.04–0.54)
EOSINOPHIL NFR BLD: 3.3 % (ref 0.7–7)
ERYTHROCYTE [DISTWIDTH] IN BLOOD BY AUTOMATED COUNT: 12.9 % (ref 11.6–14.4)
GLOBULIN: 4.3 G/DL
GLUCOSE SERPL-MCNC: 148 MG/DL (ref 74–106)
HCT VFR BLD AUTO: 52 % (ref 40.1–51)
HGB BLD-MCNC: 18.1 G/DL (ref 13.7–17.5)
LYMPHOCYTES # BLD: 1.29 K/UL (ref 1.18–3.74)
LYMPHOCYTES NFR BLD: 30.5 % (ref 19.3–53.1)
MCH RBC QN AUTO: 33.6 PG (ref 25.7–32.2)
MCHC RBC AUTO-ENTMCNC: 34.8 G/DL (ref 32.3–36.5)
MCV RBC AUTO: 96.7 FL (ref 79–92.2)
MONOCYTES # BLD: 0.46 K/UL (ref 0.24–0.82)
MONOCYTES NFR BLD: 10.9 % (ref 4.7–12.5)
NEUTROPHILS # BLD: 2.29 K/UL (ref 1.56–6.13)
NEUTS SEG NFR BLD: 54.2 % (ref 34–71.1)
PLATELET # BLD AUTO: 63 K/UL (ref 163–337)
PMV BLD AUTO: 11.7 FL (ref 7.4–10.4)
POTASSIUM SERPL-SCNC: 4.3 MMOL/L (ref 3.5–5.1)
PROT SERPL-MCNC: 8.9 G/DL (ref 6.3–8.2)
RBC # BLD AUTO: 5.38 M/UL (ref 4.63–6.08)
SODIUM SERPL-SCNC: 141 MMOL/L (ref 137–145)
WBC # BLD AUTO: 4.23 K/UL (ref 4.23–9.07)

## 2024-01-03 PROCEDURE — 99214 OFFICE O/P EST MOD 30 MIN: CPT | Performed by: NURSE PRACTITIONER

## 2024-01-03 PROCEDURE — 36415 COLL VENOUS BLD VENIPUNCTURE: CPT

## 2024-01-03 PROCEDURE — 80053 COMPREHEN METABOLIC PANEL: CPT

## 2024-01-03 PROCEDURE — 99212 OFFICE O/P EST SF 10 MIN: CPT

## 2024-01-03 PROCEDURE — 85025 COMPLETE CBC W/AUTO DIFF WBC: CPT

## 2024-01-08 VITALS
OXYGEN SATURATION: 96 % | DIASTOLIC BLOOD PRESSURE: 106 MMHG | TEMPERATURE: 98.2 F | HEIGHT: 75 IN | HEART RATE: 72 BPM | WEIGHT: 259 LBS | SYSTOLIC BLOOD PRESSURE: 130 MMHG | BODY MASS INDEX: 32.2 KG/M2

## 2024-05-17 ENCOUNTER — TELEPHONE (OUTPATIENT)
Dept: HEMATOLOGY | Age: 54
End: 2024-05-17

## 2024-05-17 NOTE — TELEPHONE ENCOUNTER
Called Patient and reminded patient of their appointment on 05/22/2024 and patient confirmed they would be here.

## 2024-05-22 ENCOUNTER — HOSPITAL ENCOUNTER (OUTPATIENT)
Dept: INFUSION THERAPY | Age: 54
Discharge: HOME | End: 2024-05-22
Payer: COMMERCIAL

## 2024-05-22 ENCOUNTER — OFFICE VISIT (OUTPATIENT)
Dept: HEMATOLOGY | Age: 54
End: 2024-05-22
Payer: COMMERCIAL

## 2024-05-22 VITALS
BODY MASS INDEX: 32.7 KG/M2 | TEMPERATURE: 98.8 F | HEART RATE: 83 BPM | HEIGHT: 75 IN | DIASTOLIC BLOOD PRESSURE: 80 MMHG | WEIGHT: 263 LBS | SYSTOLIC BLOOD PRESSURE: 140 MMHG | OXYGEN SATURATION: 96 %

## 2024-05-22 DIAGNOSIS — D69.6 THROMBOCYTOPENIA (HCC): ICD-10-CM

## 2024-05-22 DIAGNOSIS — R16.2 HEPATOSPLENOMEGALY: ICD-10-CM

## 2024-05-22 DIAGNOSIS — R71.8 ELEVATED MCV: ICD-10-CM

## 2024-05-22 DIAGNOSIS — D69.6 THROMBOCYTOPENIA (HCC): Primary | ICD-10-CM

## 2024-05-22 DIAGNOSIS — K70.30 ALCOHOLIC CIRRHOSIS OF LIVER WITHOUT ASCITES (HCC): Primary | ICD-10-CM

## 2024-05-22 DIAGNOSIS — K70.30 ALCOHOLIC CIRRHOSIS OF LIVER WITHOUT ASCITES (HCC): ICD-10-CM

## 2024-05-22 DIAGNOSIS — Z78.9 ALCOHOL USE: ICD-10-CM

## 2024-05-22 LAB
ALBUMIN SERPL-MCNC: 4.3 G/DL (ref 3.5–5.2)
ALP SERPL-CCNC: 220 U/L (ref 40–130)
ALT SERPL-CCNC: 48 U/L (ref 21–72)
ANION GAP SERPL CALCULATED.3IONS-SCNC: 10 MMOL/L (ref 7–19)
AST SERPL-CCNC: 63 U/L (ref 17–59)
BASOPHILS # BLD: 0.03 K/UL (ref 0.01–0.08)
BASOPHILS NFR BLD: 0.6 % (ref 0.1–1.2)
BILIRUB SERPL-MCNC: 0.7 MG/DL (ref 0.2–1.3)
BUN SERPL-MCNC: 10 MG/DL (ref 9–20)
CALCIUM SERPL-MCNC: 8.9 MG/DL (ref 8.4–10.2)
CHLORIDE SERPL-SCNC: 107 MMOL/L (ref 98–111)
CO2 SERPL-SCNC: 21 MMOL/L (ref 22–29)
CREAT SERPL-MCNC: 0.6 MG/DL (ref 0.6–1.2)
EOSINOPHIL # BLD: 0.13 K/UL (ref 0.04–0.54)
EOSINOPHIL NFR BLD: 2.6 % (ref 0.7–7)
ERYTHROCYTE [DISTWIDTH] IN BLOOD BY AUTOMATED COUNT: 13 % (ref 11.6–14.4)
GLUCOSE SERPL-MCNC: 195 MG/DL (ref 74–106)
HCT VFR BLD AUTO: 44.3 % (ref 40.1–51)
HGB BLD-MCNC: 15.8 G/DL (ref 13.7–17.5)
LYMPHOCYTES # BLD: 1.11 K/UL (ref 1.18–3.74)
LYMPHOCYTES NFR BLD: 21.8 % (ref 19.3–53.1)
MCH RBC QN AUTO: 34.2 PG (ref 25.7–32.2)
MCHC RBC AUTO-ENTMCNC: 35.7 G/DL (ref 32.3–36.5)
MCV RBC AUTO: 95.9 FL (ref 79–92.2)
MONOCYTES # BLD: 0.61 K/UL (ref 0.24–0.82)
MONOCYTES NFR BLD: 12 % (ref 4.7–12.5)
NEUTROPHILS # BLD: 3.2 K/UL (ref 1.56–6.13)
NEUTS SEG NFR BLD: 62.8 % (ref 34–71.1)
PLATELET # BLD AUTO: 66 K/UL (ref 163–337)
PMV BLD AUTO: 11.3 FL (ref 7.4–10.4)
POTASSIUM SERPL-SCNC: 3.9 MMOL/L (ref 3.5–5.1)
PROT SERPL-MCNC: 7.7 G/DL (ref 6.3–8.2)
RBC # BLD AUTO: 4.62 M/UL (ref 4.63–6.08)
SODIUM SERPL-SCNC: 138 MMOL/L (ref 137–145)
WBC # BLD AUTO: 5.09 K/UL (ref 4.23–9.07)

## 2024-05-22 PROCEDURE — 80053 COMPREHEN METABOLIC PANEL: CPT

## 2024-05-22 PROCEDURE — 36415 COLL VENOUS BLD VENIPUNCTURE: CPT

## 2024-05-22 PROCEDURE — 85025 COMPLETE CBC W/AUTO DIFF WBC: CPT

## 2024-05-22 PROCEDURE — 99212 OFFICE O/P EST SF 10 MIN: CPT

## 2024-05-22 PROCEDURE — 99213 OFFICE O/P EST LOW 20 MIN: CPT | Performed by: NURSE PRACTITIONER

## 2024-05-22 ASSESSMENT — ENCOUNTER SYMPTOMS
WHEEZING: 0
EYE DISCHARGE: 0
ABDOMINAL PAIN: 0
RESPIRATORY NEGATIVE: 1
EYE PAIN: 0
CONSTIPATION: 0
SHORTNESS OF BREATH: 0
EYES NEGATIVE: 1
NAUSEA: 0
SORE THROAT: 0
GASTROINTESTINAL NEGATIVE: 1
EYE REDNESS: 0
BLOOD IN STOOL: 0
DIARRHEA: 0
COUGH: 0
VOMITING: 0
BACK PAIN: 0

## 2024-05-22 NOTE — PROGRESS NOTES
Progress note      Pt Name: Michael Sainz  YOB: 1970  MRN: [id number]    Date of evaluation: 05/22/2024  History Obtained From:  patient, electronic medical record    CHIEF COMPLAINT:    Chief Complaint   Patient presents with    Follow-up     Thrombocytopenia     HISTORY OF PRESENT ILLNESS:    Michael Sainz is a 53 y.o.  male who is followed for thrombocytopenia suspected to be secondary to hepatosplenomegaly, long-term alcohol use and suspected hepatic cirrhosis.  Bone marrow aspiration biopsy on 10/14/2022 was unrevealing with no findings to of bone marrow dyscrasia to include myeloproliferative disorder or malignancy. Current recommendation is for conservative monitoring to include AFP and ultrasound of the abdomen every 6 months due to increased risk development of hepatocellular carcinoma.  Michael returns today in follow-up, lab monitoring and for further treatment recommendations.      Today's clinic visit to include physical assessment, review of systems, any lab or radiographic findings that were available and plan of care are documented below.    HEMATOLOGIC HISTORY:   Diagnosis:  Thrombocytopenia with hepatosplenomegaly, long-term alcohol use and suspected hepatic cirrhosis    Treatment summary:  Recommend cessation of alcohol use  AFP and ultrasound of the abdomen every 6 months due to increased risk development of hepatocellular carcinoma  Conservative monitoring    Michael was seen in initial hematology consultation on 8/10/2022 for thrombocytopenia, referred by Dr Salguero for  further evaluation and recommendations.  Michael has a past medical history to include hypertension, elevated liver enzymes,  type II diabetes, tobacco use smoker 1 pack per day and routine consumption of alcohol (Drinks 6 beers every night and 18 on Saturday and Sunday since age 14).  He denied any excessive bruising or bleeding to include melena, epistaxis, hemoptysis, hematuria or hematochezia.  Michael

## 2024-12-06 ENCOUNTER — TELEPHONE (OUTPATIENT)
Dept: HEMATOLOGY | Age: 54
End: 2024-12-06

## 2024-12-06 NOTE — TELEPHONE ENCOUNTER
Called Patient and reminded patient of their appointment on 12/10/2024 and patient confirmed they would be here. Reminded patient to just come at appointment time, and to not come at the lab appointment time. Reminded patient that we will not check them in any more than 30 minutes before appointment time.  We have now moved to the [facility] [place] that is located between our old office and the ER at the \[facility]". Letting the Pt know that our front entrance faces the  [employer] [place]. Reminded pt to eat well and be well hydrated for their labs.

## 2024-12-09 DIAGNOSIS — K70.30 ALCOHOLIC CIRRHOSIS OF LIVER WITHOUT ASCITES (HCC): ICD-10-CM

## 2024-12-09 DIAGNOSIS — D69.6 THROMBOCYTOPENIA (HCC): Primary | ICD-10-CM

## 2024-12-10 ENCOUNTER — OFFICE VISIT (OUTPATIENT)
Dept: HEMATOLOGY | Age: 54
End: 2024-12-10
Payer: COMMERCIAL

## 2024-12-10 ENCOUNTER — HOSPITAL ENCOUNTER (OUTPATIENT)
Dept: INFUSION THERAPY | Age: 54
Discharge: HOME | End: 2024-12-10
Payer: COMMERCIAL

## 2024-12-10 VITALS
SYSTOLIC BLOOD PRESSURE: 138 MMHG | OXYGEN SATURATION: 94 % | BODY MASS INDEX: 31.73 KG/M2 | HEART RATE: 97 BPM | TEMPERATURE: 98.8 F | WEIGHT: 255.2 LBS | DIASTOLIC BLOOD PRESSURE: 86 MMHG | HEIGHT: 75 IN

## 2024-12-10 DIAGNOSIS — D69.6 THROMBOCYTOPENIA (HCC): Primary | ICD-10-CM

## 2024-12-10 DIAGNOSIS — K70.30 ALCOHOLIC CIRRHOSIS OF LIVER WITHOUT ASCITES (HCC): ICD-10-CM

## 2024-12-10 DIAGNOSIS — R16.2 HEPATOSPLENOMEGALY: ICD-10-CM

## 2024-12-10 DIAGNOSIS — R71.8 ELEVATED MCV: ICD-10-CM

## 2024-12-10 DIAGNOSIS — D69.6 THROMBOCYTOPENIA (HCC): ICD-10-CM

## 2024-12-10 LAB
AFP SERPL-MCNC: 7.3 NG/ML (ref 0–8.3)
ALBUMIN SERPL-MCNC: 3.7 G/DL (ref 3.5–5.2)
ALP SERPL-CCNC: 236 U/L (ref 40–129)
ALT SERPL-CCNC: 64 U/L (ref 5–41)
ANION GAP SERPL CALCULATED.3IONS-SCNC: 9 MMOL/L (ref 7–19)
AST SERPL-CCNC: 69 U/L (ref 5–40)
BASOPHILS # BLD: 0.03 K/UL (ref 0.01–0.08)
BASOPHILS NFR BLD: 0.8 % (ref 0.1–1.2)
BILIRUB SERPL-MCNC: 1.2 MG/DL (ref 0–1.2)
BUN SERPL-MCNC: 11 MG/DL (ref 6–20)
CALCIUM SERPL-MCNC: 8.7 MG/DL (ref 8.6–10)
CHLORIDE SERPL-SCNC: 105 MMOL/L (ref 98–107)
CO2 SERPL-SCNC: 26 MMOL/L (ref 22–29)
CREAT SERPL-MCNC: 0.8 MG/DL (ref 0.7–1.2)
EOSINOPHIL # BLD: 0.11 K/UL (ref 0.04–0.54)
EOSINOPHIL NFR BLD: 2.9 % (ref 0.7–7)
ERYTHROCYTE [DISTWIDTH] IN BLOOD BY AUTOMATED COUNT: 13 % (ref 11.6–14.4)
GLUCOSE SERPL-MCNC: 163 MG/DL (ref 70–99)
HCT VFR BLD AUTO: 44.8 % (ref 40.1–51)
HGB BLD-MCNC: 15.7 G/DL (ref 13.7–17.5)
LYMPHOCYTES # BLD: 0.89 K/UL (ref 1.18–3.74)
LYMPHOCYTES NFR BLD: 23.5 % (ref 19.3–53.1)
MAGNESIUM SERPL-MCNC: 1.8 MG/DL (ref 1.6–2.6)
MCH RBC QN AUTO: 34.3 PG (ref 25.7–32.2)
MCHC RBC AUTO-ENTMCNC: 35 G/DL (ref 32.3–36.5)
MCV RBC AUTO: 97.8 FL (ref 79–92.2)
MONOCYTES # BLD: 0.45 K/UL (ref 0.24–0.82)
MONOCYTES NFR BLD: 11.9 % (ref 4.7–12.5)
NEUTROPHILS # BLD: 2.29 K/UL (ref 1.56–6.13)
NEUTS SEG NFR BLD: 60.6 % (ref 34–71.1)
PLATELET # BLD AUTO: 49 K/UL (ref 163–337)
PMV BLD AUTO: 11.1 FL (ref 7.4–10.4)
POTASSIUM SERPL-SCNC: 4.1 MMOL/L (ref 3.5–5.1)
PROT SERPL-MCNC: 7.6 G/DL (ref 6.4–8.3)
RBC # BLD AUTO: 4.58 M/UL (ref 4.63–6.08)
SODIUM SERPL-SCNC: 140 MMOL/L (ref 136–145)
WBC # BLD AUTO: 3.78 K/UL (ref 4.23–9.07)

## 2024-12-10 PROCEDURE — 83735 ASSAY OF MAGNESIUM: CPT

## 2024-12-10 PROCEDURE — 99214 OFFICE O/P EST MOD 30 MIN: CPT | Performed by: NURSE PRACTITIONER

## 2024-12-10 PROCEDURE — 85025 COMPLETE CBC W/AUTO DIFF WBC: CPT

## 2024-12-10 PROCEDURE — 80053 COMPREHEN METABOLIC PANEL: CPT

## 2024-12-10 PROCEDURE — 36415 COLL VENOUS BLD VENIPUNCTURE: CPT

## 2024-12-10 PROCEDURE — 99212 OFFICE O/P EST SF 10 MIN: CPT

## 2024-12-10 RX ORDER — ASCORBIC ACID 500 MG
1000 TABLET ORAL DAILY
COMMUNITY

## 2024-12-10 RX ORDER — PROPRANOLOL HYDROCHLORIDE 10 MG/1
10 TABLET ORAL NIGHTLY
COMMUNITY

## 2024-12-10 RX ORDER — SILDENAFIL 100 MG/1
100 TABLET, FILM COATED ORAL PRN
COMMUNITY

## 2024-12-10 RX ORDER — ATORVASTATIN CALCIUM 10 MG/1
10 TABLET, FILM COATED ORAL DAILY
COMMUNITY

## 2024-12-10 RX ORDER — MONTELUKAST SODIUM 10 MG/1
10 TABLET ORAL NIGHTLY
COMMUNITY

## 2024-12-16 ASSESSMENT — ENCOUNTER SYMPTOMS
BLOOD IN STOOL: 0
RESPIRATORY NEGATIVE: 1
VOMITING: 0
CONSTIPATION: 0
SORE THROAT: 0
EYE DISCHARGE: 0
BACK PAIN: 0
EYE REDNESS: 0
DIARRHEA: 0
COUGH: 0
WHEEZING: 0
SHORTNESS OF BREATH: 0
EYE PAIN: 0
EYES NEGATIVE: 1
NAUSEA: 0
ABDOMINAL PAIN: 0
GASTROINTESTINAL NEGATIVE: 1

## 2025-06-19 ENCOUNTER — TELEPHONE (OUTPATIENT)
Dept: HEMATOLOGY | Age: 55
End: 2025-06-19

## 2025-06-19 NOTE — TELEPHONE ENCOUNTER
Called Patient and reminded patient of their appointment on 06/24/2025 and patient confirmed they would be here. Reminded patient to just come at appointment time, and to not come at the lab appointment time.  We have now moved to the [facility] [place] that is located between our old office and the ER at the [facility]. Letting the Pt know that our front entrance faces the  [employer] [place]. Reminded pt to eat well and be well hydrated for their labs.

## 2025-06-22 DIAGNOSIS — K70.30 ALCOHOLIC CIRRHOSIS OF LIVER WITHOUT ASCITES (HCC): ICD-10-CM

## 2025-06-22 DIAGNOSIS — R16.2 HEPATOSPLENOMEGALY: Primary | ICD-10-CM

## 2025-06-22 NOTE — PROGRESS NOTES
Progress note      Pt Name: Michael Sainz  YOB: 1970  MRN: [id number]    Date of evaluation: 06/24/2025  History Obtained From:  patient, electronic medical record    CHIEF COMPLAINT:    Chief Complaint   Patient presents with    Follow-up     Follow-up: no issues r concerns per patient  Thrombocytopenia     HISTORY OF PRESENT ILLNESS:    Michael Sainz is a 54 y.o.  male who is followed for thrombocytopenia suspected to be secondary to hepatosplenomegaly, long-term alcohol use and suspected hepatic cirrhosis.  Bone marrow aspiration biopsy on 10/14/2022 was unrevealing with no findings to of bone marrow dyscrasia to include myeloproliferative disorder or malignancy. Current recommendation is for conservative monitoring to include AFP and ultrasound of the abdomen every 6 months due to increased risk development of hepatocellular carcinoma.  Michael returns today in follow-up, lab monitoring and for further treatment recommendations.      History of Present Illness  He underwent an endoscopy in 04/2025, during which a single band was placed and anticipating follow-up endoscopy on 7/18/2025 with Dr. Perez.  CBC today indicates new onset pancytopenia with neutropenia. He reports no instances of bleeding or blood donation. Minor bleeding from dog scratches has been noted. No antibiotics have been taken. He reports no fevers or signs of infection. There have been no changes to his medication regimen, and he has not been taking metformin. He was diagnosed with stomach ulcers, which have since resolved, and was prescribed medication for this condition but discontinued its use. Another scope is scheduled for 07/18/2025. He has experienced tick bites on several occasions, with 2 to 3 ticks removed last week. Slightly increased fatigue has been reported.    He admits to drinking approximately 30 packs of beer a week. He smokes 1 pack a day and has been smoking since he was 19 years old. He has

## 2025-06-24 ENCOUNTER — OFFICE VISIT (OUTPATIENT)
Dept: HEMATOLOGY | Age: 55
End: 2025-06-24
Payer: COMMERCIAL

## 2025-06-24 ENCOUNTER — RESULTS FOLLOW-UP (OUTPATIENT)
Dept: HEMATOLOGY | Age: 55
End: 2025-06-24

## 2025-06-24 ENCOUNTER — HOSPITAL ENCOUNTER (OUTPATIENT)
Dept: INFUSION THERAPY | Age: 55
Discharge: HOME | End: 2025-06-24
Payer: COMMERCIAL

## 2025-06-24 VITALS
DIASTOLIC BLOOD PRESSURE: 80 MMHG | WEIGHT: 260.2 LBS | TEMPERATURE: 98.9 F | BODY MASS INDEX: 32.35 KG/M2 | HEART RATE: 85 BPM | HEIGHT: 75 IN | OXYGEN SATURATION: 97 % | SYSTOLIC BLOOD PRESSURE: 142 MMHG

## 2025-06-24 DIAGNOSIS — D61.818 PANCYTOPENIA (HCC): ICD-10-CM

## 2025-06-24 DIAGNOSIS — S30.860A TICK BITE OF PELVIC REGION, INITIAL ENCOUNTER: ICD-10-CM
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
## 2025-06-24 DIAGNOSIS — K70.30 ALCOHOLIC CIRRHOSIS OF LIVER WITHOUT ASCITES (HCC): ICD-10-CM

## 2025-06-24 DIAGNOSIS — D50.0 IRON DEFICIENCY ANEMIA DUE TO CHRONIC BLOOD LOSS: ICD-10-CM

## 2025-06-24 DIAGNOSIS — D70.9 NEUTROPENIA, UNSPECIFIED TYPE: Primary | ICD-10-CM

## 2025-06-24 DIAGNOSIS — D69.6 THROMBOCYTOPENIA: ICD-10-CM

## 2025-06-24 DIAGNOSIS — D70.9 NEUTROPENIA, UNSPECIFIED TYPE: ICD-10-CM

## 2025-06-24 DIAGNOSIS — R16.2 HEPATOSPLENOMEGALY: ICD-10-CM

## 2025-06-24 LAB
AFP SERPL-MCNC: 5 NG/ML (ref 0–8.3)
ALBUMIN SERPL-MCNC: 3.6 G/DL (ref 3.5–5.2)
ALP SERPL-CCNC: 239 U/L (ref 40–129)
ALT SERPL-CCNC: 44 U/L (ref 5–41)
ANION GAP SERPL CALCULATED.3IONS-SCNC: 11 MMOL/L (ref 7–19)
AST SERPL-CCNC: 62 U/L (ref 5–40)
BASOPHILS # BLD: 0.03 K/UL (ref 0–0.2)
BASOPHILS NFR BLD: 1.1 % (ref 0–1)
BILIRUB SERPL-MCNC: 0.8 MG/DL (ref 0–1.2)
BUN SERPL-MCNC: 7 MG/DL (ref 6–20)
CALCIUM SERPL-MCNC: 8.5 MG/DL (ref 8.6–10)
CHLORIDE SERPL-SCNC: 105 MMOL/L (ref 98–107)
CO2 SERPL-SCNC: 22 MMOL/L (ref 22–29)
CREAT SERPL-MCNC: 0.7 MG/DL (ref 0.7–1.2)
EOSINOPHIL # BLD: 0.11 K/UL (ref 0–0.6)
EOSINOPHIL NFR BLD: 3.9 % (ref 0–5)
ERYTHROCYTE [DISTWIDTH] IN BLOOD BY AUTOMATED COUNT: 17.4 % (ref 11.5–14.5)
FERRITIN SERPL-MCNC: 40.6 NG/ML (ref 30–400)
FOLATE SERPL-MCNC: 19.9 NG/ML (ref 4.5–32.2)
GLUCOSE SERPL-MCNC: 169 MG/DL (ref 70–99)
HCT VFR BLD AUTO: 37.2 % (ref 42–52)
HGB BLD-MCNC: 12.4 G/DL (ref 14–18)
HIV-1 P24 AG: NORMAL
HIV1+2 AB SERPLBLD QL IA.RAPID: NORMAL
IRON SATN MFR SERPL: 6 % (ref 20–50)
IRON SERPL-MCNC: 21 UG/DL (ref 59–158)
LDH SERPL-CCNC: 268 U/L (ref 135–225)
LYMPHOCYTES # BLD: 0.76 K/UL (ref 1.1–4.5)
LYMPHOCYTES NFR BLD: 27 % (ref 20–40)
MCH RBC QN AUTO: 28.2 PG (ref 27–31)
MCHC RBC AUTO-ENTMCNC: 33.3 G/DL (ref 33–37)
MCV RBC AUTO: 84.7 FL (ref 80–94)
MONOCYTES # BLD: 0.5 K/UL (ref 0–0.9)
MONOCYTES NFR BLD: 17.7 % (ref 1–10)
NEUTROPHILS # BLD: 1.42 K/UL (ref 1.5–7.5)
NEUTS SEG NFR BLD: 50.3 % (ref 50–65)
PLATELET # BLD AUTO: 51 K/UL (ref 130–400)
PMV BLD AUTO: 12.4 FL (ref 9.4–12.4)
POTASSIUM SERPL-SCNC: 3.9 MMOL/L (ref 3.5–5.1)
PROT SERPL-MCNC: 7.3 G/DL (ref 6.4–8.3)
RBC # BLD AUTO: 4.39 M/UL (ref 4.7–6.1)
SODIUM SERPL-SCNC: 138 MMOL/L (ref 136–145)
TIBC SERPL-MCNC: 376 UG/DL (ref 250–400)
VIT B12 SERPL-MCNC: 982 PG/ML (ref 232–1245)
WBC # BLD AUTO: 2.82 K/UL (ref 4.8–10.8)

## 2025-06-24 PROCEDURE — 99213 OFFICE O/P EST LOW 20 MIN: CPT

## 2025-06-24 PROCEDURE — 82105 ALPHA-FETOPROTEIN SERUM: CPT

## 2025-06-24 PROCEDURE — 82728 ASSAY OF FERRITIN: CPT

## 2025-06-24 PROCEDURE — 83540 ASSAY OF IRON: CPT

## 2025-06-24 PROCEDURE — 87798 DETECT AGENT NOS DNA AMP: CPT

## 2025-06-24 PROCEDURE — 80053 COMPREHEN METABOLIC PANEL: CPT

## 2025-06-24 PROCEDURE — 99214 OFFICE O/P EST MOD 30 MIN: CPT | Performed by: NURSE PRACTITIONER

## 2025-06-24 PROCEDURE — 83550 IRON BINDING TEST: CPT

## 2025-06-24 PROCEDURE — 86665 EPSTEIN-BARR CAPSID VCA: CPT

## 2025-06-24 PROCEDURE — 86617 LYME DISEASE ANTIBODY: CPT

## 2025-06-24 PROCEDURE — 86664 EPSTEIN-BARR NUCLEAR ANTIGEN: CPT

## 2025-06-24 PROCEDURE — 36415 COLL VENOUS BLD VENIPUNCTURE: CPT

## 2025-06-24 PROCEDURE — 82607 VITAMIN B-12: CPT

## 2025-06-24 PROCEDURE — 83615 LACTATE (LD) (LDH) ENZYME: CPT

## 2025-06-24 PROCEDURE — 87806 HIV AG W/HIV1&2 ANTB W/OPTIC: CPT

## 2025-06-24 PROCEDURE — 85025 COMPLETE CBC W/AUTO DIFF WBC: CPT

## 2025-06-24 PROCEDURE — 86663 EPSTEIN-BARR ANTIBODY: CPT

## 2025-06-24 PROCEDURE — 82746 ASSAY OF FOLIC ACID SERUM: CPT

## 2025-06-24 PROCEDURE — 83010 ASSAY OF HAPTOGLOBIN QUANT: CPT

## 2025-06-24 RX ORDER — DOXYCYCLINE HYCLATE 100 MG
100 TABLET ORAL 2 TIMES DAILY
Qty: 20 TABLET | Refills: 0 | Status: SHIPPED | OUTPATIENT
Start: 2025-06-24 | End: 2025-07-04

## 2025-06-24 ASSESSMENT — ENCOUNTER SYMPTOMS
GASTROINTESTINAL NEGATIVE: 1
SORE THROAT: 0
EYE PAIN: 0
BACK PAIN: 0
EYE DISCHARGE: 0
WHEEZING: 0
VOMITING: 0
CONSTIPATION: 0
SHORTNESS OF BREATH: 0
ABDOMINAL PAIN: 0
DIARRHEA: 0
RESPIRATORY NEGATIVE: 1
BLOOD IN STOOL: 0
COUGH: 0
NAUSEA: 0
EYE REDNESS: 0
EYES NEGATIVE: 1

## 2025-06-25 LAB — HAPTOGLOB SERPL-MCNC: 94.7 MG/DL (ref 30–200)

## 2025-06-26 LAB
EBV EA-D IGG SER-ACNC: 32.6 U/ML (ref 0–10.9)
EBV NA IGG SER IA-ACNC: >600 U/ML (ref 0–21.9)
EBV VCA IGG SER IA-ACNC: 589 U/ML (ref 0–21.9)
EBV VCA IGM SER IA-ACNC: 26.8 U/ML (ref 0–43.9)

## 2025-06-27 LAB
ANAPLASMA PHAGNOCYTOPHILUM BY PCR: NOT DETECTED
B BURGDOR IGG SER QL IB: NEGATIVE
B BURGDOR IGM SER QL IB: NEGATIVE
CMV DNA SERPL NAA+PROBE-ACNC: NOT DETECTED IU/ML
CMV DNA SERPL NAA+PROBE-LOG IU: NOT DETECTED LOG IU/ML
CMV DNA SERPL QL NAA+PROBE: NOT DETECTED
E CHAFFEENSIS DNA BLD QL NAA+PROBE: NOT DETECTED
E EWINGII DNA SPEC QL NAA+PROBE: NOT DETECTED
EHRLICHIA DNA SPEC QL NAA+PROBE: NOT DETECTED

## 2025-06-30 NOTE — TELEPHONE ENCOUNTER
----- Message from Tanesha CASTRO sent at 6/24/2025 12:59 PM CDT -----  Please call and discuss the need for IV iron replacement    Please build supportive plan for Feraheme 510 mg mg IV weekly x2 doses.  Please contact patient once insurance approval has been obtained and appointments have been arranged

## 2025-06-30 NOTE — TELEPHONE ENCOUNTER
Called patient and reviewed lab results, iron saturation is 6%.  States that he does not want to do IV iron, would prefer to take oral iron.  Instructed that KIMBERLY Dyer would like for him to get over the counter slow release iron and take one tablet (45 mg) daily.  Instructed patient on medication, dosage, frequency, and side effects. Also instructed to take it with orange juice or take a vitamin C with it to help absorption.  Instructed to call with any problems.  Patient v/u and is agreeable to plan.

## 2025-06-30 NOTE — TELEPHONE ENCOUNTER
Spoke with Mr. Sainz related to his labs being negative for tickborne illness, instructed him to discontinue the doxycycline.  He verbalized understanding.  He is going in the morning, 7/1/2025 for a repeat CBC to reevaluate his new onset of pancytopenia.    He denied any new complaints or symptoms.

## 2025-07-03 ENCOUNTER — TELEPHONE (OUTPATIENT)
Dept: HEMATOLOGY | Age: 55
End: 2025-07-03

## 2025-07-03 DIAGNOSIS — D61.818 PANCYTOPENIA (HCC): Primary | ICD-10-CM

## 2025-07-03 NOTE — TELEPHONE ENCOUNTER
Called patient and reviewed lab results, WBC 2.7 was 2.82, Hgb 11.9 was 12.4 and platelets up to 56,000 was 51,000.  Instructed that KIMBERLY Dyer would like for him to get a repeat CBC in two weeks.  If counts are still low she will contact him about further testing.  Patient goes to Dr Salguero's office to have his labs drawn. Instructed to call with any problems.  Patient v/u and is agreeable to plan.

## 2025-07-25 ENCOUNTER — TELEPHONE (OUTPATIENT)
Dept: HEMATOLOGY | Age: 55
End: 2025-07-25

## 2025-07-25 NOTE — TELEPHONE ENCOUNTER
Called patient and reviewed lab results.  WBC 3.8, Hgb 14.0, Hct 44.5, platelets 53,000.  Informed that WBC and Hgb and Hct are all normal now and platelet count is in his normal range.  Instructed that we will repeat his labs at his follow up in September. Instructed to call with any problems.  Patient v/u and is agreeable to plan.

## (undated) DEVICE — CANNULA NSL AD L7FT DIV O2 CO2 W/ M LUERLOCK TRMPT CONN